# Patient Record
Sex: MALE | Race: WHITE | Employment: UNEMPLOYED | URBAN - NONMETROPOLITAN AREA
[De-identification: names, ages, dates, MRNs, and addresses within clinical notes are randomized per-mention and may not be internally consistent; named-entity substitution may affect disease eponyms.]

---

## 2022-08-16 ENCOUNTER — OFFICE VISIT (OUTPATIENT)
Dept: FAMILY MEDICINE CLINIC | Age: 62
End: 2022-08-16
Payer: MEDICARE

## 2022-08-16 VITALS
SYSTOLIC BLOOD PRESSURE: 108 MMHG | TEMPERATURE: 98.9 F | OXYGEN SATURATION: 96 % | WEIGHT: 183.2 LBS | HEIGHT: 67 IN | HEART RATE: 98 BPM | BODY MASS INDEX: 28.75 KG/M2 | DIASTOLIC BLOOD PRESSURE: 70 MMHG

## 2022-08-16 DIAGNOSIS — N13.8 BPH WITH OBSTRUCTION/LOWER URINARY TRACT SYMPTOMS: ICD-10-CM

## 2022-08-16 DIAGNOSIS — N40.1 BPH WITH OBSTRUCTION/LOWER URINARY TRACT SYMPTOMS: ICD-10-CM

## 2022-08-16 DIAGNOSIS — E55.9 VITAMIN D DEFICIENCY: ICD-10-CM

## 2022-08-16 DIAGNOSIS — Z12.5 PROSTATE CANCER SCREENING: ICD-10-CM

## 2022-08-16 DIAGNOSIS — Z13.220 SCREENING, LIPID: ICD-10-CM

## 2022-08-16 DIAGNOSIS — Z13.29 SCREENING FOR THYROID DISORDER: ICD-10-CM

## 2022-08-16 DIAGNOSIS — D18.01 CHERRY ANGIOMA: ICD-10-CM

## 2022-08-16 DIAGNOSIS — Z13.1 SCREENING FOR DIABETES MELLITUS: ICD-10-CM

## 2022-08-16 DIAGNOSIS — Z13.0 SCREENING FOR DEFICIENCY ANEMIA: ICD-10-CM

## 2022-08-16 DIAGNOSIS — I10 BENIGN ESSENTIAL HTN: Primary | ICD-10-CM

## 2022-08-16 PROBLEM — N43.3 HYDROCELE IN ADULT: Status: ACTIVE | Noted: 2018-07-13

## 2022-08-16 PROBLEM — M54.17 LUMBOSACRAL RADICULOPATHY: Status: ACTIVE | Noted: 2017-12-01

## 2022-08-16 PROCEDURE — 99203 OFFICE O/P NEW LOW 30 MIN: CPT | Performed by: FAMILY MEDICINE

## 2022-08-16 RX ORDER — MELOXICAM 15 MG/1
15 TABLET ORAL DAILY
COMMUNITY
Start: 2022-06-16

## 2022-08-16 RX ORDER — TIZANIDINE 2 MG/1
2 TABLET ORAL NIGHTLY PRN
COMMUNITY
Start: 2022-06-15

## 2022-08-16 RX ORDER — TERAZOSIN 5 MG/1
5 CAPSULE ORAL NIGHTLY
COMMUNITY

## 2022-08-16 SDOH — ECONOMIC STABILITY: FOOD INSECURITY: WITHIN THE PAST 12 MONTHS, THE FOOD YOU BOUGHT JUST DIDN'T LAST AND YOU DIDN'T HAVE MONEY TO GET MORE.: NEVER TRUE

## 2022-08-16 SDOH — ECONOMIC STABILITY: FOOD INSECURITY: WITHIN THE PAST 12 MONTHS, YOU WORRIED THAT YOUR FOOD WOULD RUN OUT BEFORE YOU GOT MONEY TO BUY MORE.: NEVER TRUE

## 2022-08-16 ASSESSMENT — ENCOUNTER SYMPTOMS
CHEST TIGHTNESS: 0
ABDOMINAL DISTENTION: 0
PHOTOPHOBIA: 0
ABDOMINAL PAIN: 0
SHORTNESS OF BREATH: 0

## 2022-08-16 ASSESSMENT — PATIENT HEALTH QUESTIONNAIRE - PHQ9
SUM OF ALL RESPONSES TO PHQ QUESTIONS 1-9: 0
1. LITTLE INTEREST OR PLEASURE IN DOING THINGS: 0
2. FEELING DOWN, DEPRESSED OR HOPELESS: 0
SUM OF ALL RESPONSES TO PHQ QUESTIONS 1-9: 0
SUM OF ALL RESPONSES TO PHQ9 QUESTIONS 1 & 2: 0

## 2022-08-16 ASSESSMENT — SOCIAL DETERMINANTS OF HEALTH (SDOH): HOW HARD IS IT FOR YOU TO PAY FOR THE VERY BASICS LIKE FOOD, HOUSING, MEDICAL CARE, AND HEATING?: NOT HARD AT ALL

## 2022-08-16 NOTE — PATIENT INSTRUCTIONS
Patient will continue all current medications. So far medication condition sounds stable. I did notice a history of elevated cholesterol in the past so we are rechecking to see the stability of that. Patient states he has not seen a urologist in many years we will check his PSA today. Follow-up in 3 months to confirm blood pressures under good control.

## 2022-08-16 NOTE — PROGRESS NOTES
----- Message from Giorgio Caballero MD sent at 2/7/2020  4:44 PM CST -----  Patient requesting out of network ORTHO followup as discussed;  Recall initial care with Dr Donato, now with Dr Lacey, anticipating office visit with xray and probably final visit. Scheduled on 2/11/2020   due etc.  Reviewed this with the patient      Current Outpatient Medications on File Prior to Visit   Medication Sig Dispense Refill    METOPROLOL SUCCINATE PO Take 50 mg by mouth daily      meloxicam (MOBIC) 15 MG tablet Take 15 mg by mouth in the morning. terazosin (HYTRIN) 5 MG capsule Take 5 mg by mouth nightly      tiZANidine (ZANAFLEX) 2 MG tablet Take 2 mg by mouth nightly as needed      ASPIRIN 81 PO Take by mouth       No current facility-administered medications on file prior to visit. Past Medical History:   Diagnosis Date    Hypertension      Past Surgical History:   Procedure Laterality Date    APPENDECTOMY       Social History     Socioeconomic History    Marital status: Single     Spouse name: Not on file    Number of children: Not on file    Years of education: Not on file    Highest education level: Not on file   Occupational History    Not on file   Tobacco Use    Smoking status: Never    Smokeless tobacco: Never   Substance and Sexual Activity    Alcohol use: Never    Drug use: Never    Sexual activity: Not on file   Other Topics Concern    Not on file   Social History Narrative    Not on file     Social Determinants of Health     Financial Resource Strain: Low Risk     Difficulty of Paying Living Expenses: Not hard at all   Food Insecurity: No Food Insecurity    Worried About Running Out of Food in the Last Year: Never true    Ran Out of Food in the Last Year: Never true   Transportation Needs: Not on file   Physical Activity: Not on file   Stress: Not on file   Social Connections: Not on file   Intimate Partner Violence: Not on file   Housing Stability: Not on file     Family History   Problem Relation Age of Onset    Diabetes Mother     Asthma Mother     Cancer Neg Hx     Heart Attack Neg Hx     Stroke Neg Hx      Allergies:  Patient has no known allergies. Review of Systems   Constitutional:  Negative for activity change, appetite change, diaphoresis and unexpected weight change. Eyes:  Negative for photophobia and visual disturbance. Respiratory:  Negative for chest tightness and shortness of breath. No orthopnea   Cardiovascular:  Negative for chest pain, palpitations and leg swelling. Gastrointestinal:  Negative for abdominal distention and abdominal pain. Genitourinary:  Negative for flank pain and frequency. Musculoskeletal:  Positive for arthralgias. Negative for gait problem and joint swelling. Neurological:  Negative for dizziness, weakness, light-headedness and headaches. Psychiatric/Behavioral:  Negative for confusion. Objective:   /70   Pulse 98   Temp 98.9 °F (37.2 °C)   Ht 5' 7\" (1.702 m)   Wt 183 lb 3.2 oz (83.1 kg)   SpO2 96%   BMI 28.69 kg/m²     Physical Exam  Vitals reviewed. Constitutional:       General: He is not in acute distress. Appearance: He is well-developed. HENT:      Head: Normocephalic and atraumatic. Right Ear: External ear normal.      Left Ear: External ear normal.      Nose: Nose normal.   Eyes:      General:         Right eye: No discharge. Left eye: No discharge. Conjunctiva/sclera: Conjunctivae normal.      Pupils: Pupils are equal, round, and reactive to light. Neck:      Thyroid: No thyromegaly. Vascular: No carotid bruit. Cardiovascular:      Rate and Rhythm: Normal rate and regular rhythm. Heart sounds: Normal heart sounds. Pulmonary:      Effort: Pulmonary effort is normal. No respiratory distress. Breath sounds: Normal breath sounds. Abdominal:      General: There is no distension. Musculoskeletal:      Cervical back: Neck supple. Skin:     General: Skin is warm and dry. Neurological:      Mental Status: He is alert and oriented to person, place, and time. Coordination: Coordination normal.   Psychiatric:         Thought Content: Thought content normal.         Judgment: Judgment normal.       No results found for this visit on 08/16/22.     No results found for this or any previous visit (from the past 2016 hour(s)). [] Pt was seen by provider for      Minutes  Counseling and coordination of care was done for all assessment diagnosis listed for today with patient and any family/friend present. More than 50% of this visit was spent coordinating current care, obtaining information for prior records, and counseling for current plan of action. Assessment:       Diagnosis Orders   1. Benign essential HTN        2. BPH with obstruction/lower urinary tract symptoms        3. Screening, lipid  Lipid, Fasting      4. Prostate cancer screening  PSA Screening      5. Screening for deficiency anemia  CBC with Auto Differential      6. Screening for diabetes mellitus  Basic Metabolic Panel      7. Screening for thyroid disorder  TSH with Reflex      8. Vitamin D deficiency  Vitamin D 25 Hydroxy      9. Cherry angioma              Orders Placed This Encounter   Procedures    CBC with Auto Differential     Standing Status:   Future     Standing Expiration Date:   3/76/7815    Basic Metabolic Panel     Standing Status:   Future     Standing Expiration Date:   8/16/2023    Vitamin D 25 Hydroxy     Standing Status:   Future     Standing Expiration Date:   8/16/2023    TSH with Reflex     Standing Status:   Future     Standing Expiration Date:   8/16/2023    Lipid, Fasting     Standing Status:   Future     Standing Expiration Date:   8/16/2023    PSA Screening     Standing Status:   Future     Standing Expiration Date:   8/16/2023       No orders of the defined types were placed in this encounter. Medication List            Accurate as of August 16, 2022  1:06 PM. If you have any questions, ask your nurse or doctor.                 CONTINUE taking these medications      ASPIRIN 81 PO     meloxicam 15 MG tablet  Commonly known as: MOBIC     METOPROLOL SUCCINATE PO     terazosin 5 MG capsule  Commonly known as: HYTRIN     tiZANidine 2 MG tablet  Commonly known

## 2022-08-22 DIAGNOSIS — Z13.220 SCREENING, LIPID: ICD-10-CM

## 2022-08-22 DIAGNOSIS — Z13.29 SCREENING FOR THYROID DISORDER: ICD-10-CM

## 2022-08-22 DIAGNOSIS — E55.9 VITAMIN D DEFICIENCY: ICD-10-CM

## 2022-08-22 DIAGNOSIS — Z13.1 SCREENING FOR DIABETES MELLITUS: ICD-10-CM

## 2022-08-22 DIAGNOSIS — Z13.0 SCREENING FOR DEFICIENCY ANEMIA: ICD-10-CM

## 2022-08-22 DIAGNOSIS — Z12.5 PROSTATE CANCER SCREENING: ICD-10-CM

## 2022-08-22 LAB
ANION GAP SERPL CALCULATED.3IONS-SCNC: 11 MEQ/L (ref 9–15)
BASOPHILS ABSOLUTE: 0 K/UL (ref 0–0.2)
BASOPHILS RELATIVE PERCENT: 0.7 %
BUN BLDV-MCNC: 18 MG/DL (ref 8–23)
CALCIUM SERPL-MCNC: 9.3 MG/DL (ref 8.5–9.9)
CHLORIDE BLD-SCNC: 109 MEQ/L (ref 95–107)
CHOLESTEROL, FASTING: 166 MG/DL (ref 0–199)
CO2: 21 MEQ/L (ref 20–31)
CREAT SERPL-MCNC: 1.27 MG/DL (ref 0.7–1.2)
EOSINOPHILS ABSOLUTE: 0.2 K/UL (ref 0–0.7)
EOSINOPHILS RELATIVE PERCENT: 3.1 %
GFR AFRICAN AMERICAN: >60
GFR NON-AFRICAN AMERICAN: 57.3
GLUCOSE BLD-MCNC: 88 MG/DL (ref 70–99)
HCT VFR BLD CALC: 45 % (ref 42–52)
HDLC SERPL-MCNC: 37 MG/DL (ref 40–59)
HEMOGLOBIN: 15.4 G/DL (ref 14–18)
LDL CHOLESTEROL CALCULATED: 119 MG/DL (ref 0–129)
LYMPHOCYTES ABSOLUTE: 1.3 K/UL (ref 1–4.8)
LYMPHOCYTES RELATIVE PERCENT: 21.8 %
MCH RBC QN AUTO: 31.1 PG (ref 27–31.3)
MCHC RBC AUTO-ENTMCNC: 34.2 % (ref 33–37)
MCV RBC AUTO: 91.2 FL (ref 80–100)
MONOCYTES ABSOLUTE: 0.6 K/UL (ref 0.2–0.8)
MONOCYTES RELATIVE PERCENT: 10.2 %
NEUTROPHILS ABSOLUTE: 3.7 K/UL (ref 1.4–6.5)
NEUTROPHILS RELATIVE PERCENT: 64.2 %
PDW BLD-RTO: 14 % (ref 11.5–14.5)
PLATELET # BLD: 248 K/UL (ref 130–400)
POTASSIUM SERPL-SCNC: 4.8 MEQ/L (ref 3.4–4.9)
PROSTATE SPECIFIC ANTIGEN: 1.48 NG/ML (ref 0–4)
RBC # BLD: 4.94 M/UL (ref 4.7–6.1)
SODIUM BLD-SCNC: 141 MEQ/L (ref 135–144)
TRIGLYCERIDE, FASTING: 50 MG/DL (ref 0–150)
TSH REFLEX: 2.52 UIU/ML (ref 0.44–3.86)
WBC # BLD: 5.8 K/UL (ref 4.8–10.8)

## 2022-08-23 DIAGNOSIS — R94.4 DECREASED GFR: ICD-10-CM

## 2022-08-23 DIAGNOSIS — E55.9 VITAMIN D DEFICIENCY: Primary | ICD-10-CM

## 2022-08-23 LAB — VITAMIN D 25-HYDROXY: 26.6 NG/ML

## 2022-08-23 RX ORDER — ERGOCALCIFEROL 1.25 MG/1
50000 CAPSULE ORAL WEEKLY
Qty: 4 CAPSULE | Refills: 1 | Status: SHIPPED | OUTPATIENT
Start: 2022-08-23 | End: 2022-10-12

## 2022-09-15 ENCOUNTER — TELEPHONE (OUTPATIENT)
Dept: FAMILY MEDICINE CLINIC | Age: 62
End: 2022-09-15

## 2022-09-15 ENCOUNTER — OFFICE VISIT (OUTPATIENT)
Dept: FAMILY MEDICINE CLINIC | Age: 62
End: 2022-09-15
Payer: MEDICARE

## 2022-09-15 VITALS
SYSTOLIC BLOOD PRESSURE: 122 MMHG | HEIGHT: 67 IN | DIASTOLIC BLOOD PRESSURE: 80 MMHG | BODY MASS INDEX: 29.26 KG/M2 | WEIGHT: 186.4 LBS | HEART RATE: 68 BPM | TEMPERATURE: 98.6 F | OXYGEN SATURATION: 98 %

## 2022-09-15 DIAGNOSIS — R41.89 IMPAIRMENT OF COGNITIVE FUNCTION: Primary | ICD-10-CM

## 2022-09-15 PROCEDURE — 99213 OFFICE O/P EST LOW 20 MIN: CPT | Performed by: FAMILY MEDICINE

## 2022-09-15 NOTE — PROGRESS NOTES
History     Socioeconomic History    Marital status: Single     Spouse name: Not on file    Number of children: Not on file    Years of education: Not on file    Highest education level: Not on file   Occupational History    Not on file   Tobacco Use    Smoking status: Never    Smokeless tobacco: Never   Substance and Sexual Activity    Alcohol use: Never    Drug use: Never    Sexual activity: Not on file   Other Topics Concern    Not on file   Social History Narrative    Not on file     Social Determinants of Health     Financial Resource Strain: Low Risk     Difficulty of Paying Living Expenses: Not hard at all   Food Insecurity: No Food Insecurity    Worried About Running Out of Food in the Last Year: Never true    Ran Out of Food in the Last Year: Never true   Transportation Needs: Not on file   Physical Activity: Not on file   Stress: Not on file   Social Connections: Not on file   Intimate Partner Violence: Not on file   Housing Stability: Not on file     Family History   Problem Relation Age of Onset    Diabetes Mother     Asthma Mother     Cancer Neg Hx     Heart Attack Neg Hx     Stroke Neg Hx      Allergies:  Patient has no known allergies. Objective:   /80   Pulse 68   Temp 98.6 °F (37 °C)   Ht 5' 7\" (1.702 m)   Wt 186 lb 6.4 oz (84.6 kg)   SpO2 98%   BMI 29.19 kg/m²         No results found for this visit on 09/15/22.     Recent Results (from the past 2016 hour(s))   PSA Screening    Collection Time: 08/22/22  9:03 AM   Result Value Ref Range    PSA 1.48 0.00 - 4.00 ng/mL   Lipid, Fasting    Collection Time: 08/22/22  9:03 AM   Result Value Ref Range    Cholesterol, Fasting 166 0 - 199 mg/dL    Triglyceride, Fasting 50 0 - 150 mg/dL    HDL 37 (L) 40 - 59 mg/dL    LDL Calculated 119 0 - 129 mg/dL   TSH with Reflex    Collection Time: 08/22/22  9:03 AM   Result Value Ref Range    TSH 2.520 0.440 - 3.860 uIU/mL   Vitamin D 25 Hydroxy    Collection Time: 08/22/22  9:03 AM   Result Value Ref Range    Vit D, 25-Hydroxy 26.6 (L) >29.9 ng/mL   Basic Metabolic Panel    Collection Time: 08/22/22  9:03 AM   Result Value Ref Range    Sodium 141 135 - 144 mEq/L    Potassium 4.8 3.4 - 4.9 mEq/L    Chloride 109 (H) 95 - 107 mEq/L    CO2 21 20 - 31 mEq/L    Anion Gap 11 9 - 15 mEq/L    Glucose 88 70 - 99 mg/dL    BUN 18 8 - 23 mg/dL    Creatinine 1.27 (H) 0.70 - 1.20 mg/dL    GFR Non-African American 57.3 (L) >60    GFR  >60.0 >60    Calcium 9.3 8.5 - 9.9 mg/dL   CBC with Auto Differential    Collection Time: 08/22/22  9:03 AM   Result Value Ref Range    WBC 5.8 4.8 - 10.8 K/uL    RBC 4.94 4.70 - 6.10 M/uL    Hemoglobin 15.4 14.0 - 18.0 g/dL    Hematocrit 45.0 42.0 - 52.0 %    MCV 91.2 80.0 - 100.0 fL    MCH 31.1 27.0 - 31.3 pg    MCHC 34.2 33.0 - 37.0 %    RDW 14.0 11.5 - 14.5 %    Platelets 521 422 - 413 K/uL    Neutrophils % 64.2 %    Lymphocytes % 21.8 %    Monocytes % 10.2 %    Eosinophils % 3.1 %    Basophils % 0.7 %    Neutrophils Absolute 3.7 1.4 - 6.5 K/uL    Lymphocytes Absolute 1.3 1.0 - 4.8 K/uL    Monocytes Absolute 0.6 0.2 - 0.8 K/uL    Eosinophils Absolute 0.2 0.0 - 0.7 K/uL    Basophils Absolute 0.0 0.0 - 0.2 K/uL       [x] Pt was seen by provider for   25   Minutes  Counseling and coordination of care was done for all assessment diagnosis listed for today with patient and any family/friend present. More than 50% of this visit was spent coordinating current care, obtaining information for prior records, and counseling for current plan of action. Assessment:       Diagnosis Orders   1. Impairment of cognitive function              No orders of the defined types were placed in this encounter. No orders of the defined types were placed in this encounter. Medication List            Accurate as of September 15, 2022  3:29 PM. If you have any questions, ask your nurse or doctor.                 CONTINUE taking these medications      ASPIRIN 81 PO     meloxicam 15 MG tablet  Commonly known as: MOBIC     METOPROLOL SUCCINATE PO     terazosin 5 MG capsule  Commonly known as: HYTRIN     tiZANidine 2 MG tablet  Commonly known as: ZANAFLEX     vitamin D 1.25 MG (68895 UT) Caps capsule  Commonly known as: ERGOCALCIFEROL  Take 1 capsule by mouth once a week for 8 doses                Plan:   Return for Based on test results. There are no Patient Instructions on file for this visit. This note was partially created with the assistance of dictation. This may lead to grammatical or spelling errors. Shubham Quintero M.D.

## 2022-09-15 NOTE — TELEPHONE ENCOUNTER
Debbie Suazo [facility] [address]   [address],  [address] [place] [address]

## 2022-11-17 ENCOUNTER — OFFICE VISIT (OUTPATIENT)
Dept: FAMILY MEDICINE CLINIC | Age: 62
End: 2022-11-17
Payer: MEDICARE

## 2022-11-17 VITALS
BODY MASS INDEX: 30.61 KG/M2 | SYSTOLIC BLOOD PRESSURE: 128 MMHG | DIASTOLIC BLOOD PRESSURE: 76 MMHG | OXYGEN SATURATION: 98 % | HEIGHT: 67 IN | HEART RATE: 75 BPM | WEIGHT: 195 LBS | TEMPERATURE: 97.4 F

## 2022-11-17 DIAGNOSIS — N28.9 RENAL INSUFFICIENCY: Primary | ICD-10-CM

## 2022-11-17 DIAGNOSIS — E55.9 VITAMIN D DEFICIENCY: ICD-10-CM

## 2022-11-17 DIAGNOSIS — M54.16 LUMBAR RADICULOPATHY: ICD-10-CM

## 2022-11-17 DIAGNOSIS — N28.9 RENAL INSUFFICIENCY: ICD-10-CM

## 2022-11-17 LAB
ANION GAP SERPL CALCULATED.3IONS-SCNC: 15 MEQ/L (ref 9–15)
BUN BLDV-MCNC: 16 MG/DL (ref 8–23)
CALCIUM SERPL-MCNC: 9.3 MG/DL (ref 8.5–9.9)
CHLORIDE BLD-SCNC: 101 MEQ/L (ref 95–107)
CO2: 21 MEQ/L (ref 20–31)
CREAT SERPL-MCNC: 1.24 MG/DL (ref 0.7–1.2)
GFR SERPL CREATININE-BSD FRML MDRD: >60 ML/MIN/{1.73_M2}
GLUCOSE BLD-MCNC: 84 MG/DL (ref 70–99)
POTASSIUM SERPL-SCNC: 4.4 MEQ/L (ref 3.4–4.9)
SODIUM BLD-SCNC: 137 MEQ/L (ref 135–144)

## 2022-11-17 PROCEDURE — 99214 OFFICE O/P EST MOD 30 MIN: CPT | Performed by: FAMILY MEDICINE

## 2022-11-17 PROCEDURE — 3078F DIAST BP <80 MM HG: CPT | Performed by: FAMILY MEDICINE

## 2022-11-17 PROCEDURE — 3074F SYST BP LT 130 MM HG: CPT | Performed by: FAMILY MEDICINE

## 2022-11-17 RX ORDER — ERGOCALCIFEROL 1.25 MG/1
50000 CAPSULE ORAL WEEKLY
Qty: 13 CAPSULE | Refills: 3 | Status: SHIPPED | OUTPATIENT
Start: 2022-11-17 | End: 2029-11-09

## 2022-11-17 ASSESSMENT — ENCOUNTER SYMPTOMS
SHORTNESS OF BREATH: 0
CHEST TIGHTNESS: 0
PHOTOPHOBIA: 0
BACK PAIN: 1
ABDOMINAL PAIN: 0
ABDOMINAL DISTENTION: 0

## 2022-11-17 ASSESSMENT — PATIENT HEALTH QUESTIONNAIRE - PHQ9
SUM OF ALL RESPONSES TO PHQ QUESTIONS 1-9: 0
1. LITTLE INTEREST OR PLEASURE IN DOING THINGS: 0
SUM OF ALL RESPONSES TO PHQ9 QUESTIONS 1 & 2: 0
2. FEELING DOWN, DEPRESSED OR HOPELESS: 0
SUM OF ALL RESPONSES TO PHQ QUESTIONS 1-9: 0

## 2022-11-17 NOTE — PATIENT INSTRUCTIONS
Patient is aware of weight gain. We will be working on diet control and activity. He prefers walking for his activity. Blood pressure remains controlled no changes. Renal insufficiency and vitamin D deficiency will be checked on today by lab work.   Follow-up appointment 6 months    Pt encouraged to be doing his lumbar exercises

## 2022-11-17 NOTE — PROGRESS NOTES
Diagnosis Orders   1. Renal insufficiency  Basic Metabolic Panel      2. Vitamin D deficiency  vitamin D (ERGOCALCIFEROL) 1.25 MG (90360 UT) CAPS capsule    Vitamin D 25 Hydroxy      3. Lumbar radiculopathy          Return in about 6 months (around 5/17/2023) for for review of outcome of today's recommendation. Patient Instructions   Patient is aware of weight gain. We will be working on diet control and activity. He prefers walking for his activity. Blood pressure remains controlled no changes. Renal insufficiency and vitamin D deficiency will be checked on today by lab work. Follow-up appointment 6 months    Pt encouraged to be doing his lumbar exercises    Subjective:      Patient ID: Rossy Crow is a 60 y.o. male who presents for:  Chief Complaint   Patient presents with    Hypertension     X 3 month follow up     Discuss Medications    Leg Problem     Right leg jumps when sitting        Here for follow-up of blood work and blood pressure. Feeling well overall. Doing some walking but because he is not working does not have a lot of activity and does realize he is gaining some weight and snacking too much. Patient does note pain in his low back that goes down his leg at times. It can occur when he sitting. It also can occur when he walks for a while. He is not doing any exercise. They have been in touch with resources and they are waiting for him to get assigned to a job. Current Outpatient Medications on File Prior to Visit   Medication Sig Dispense Refill    METOPROLOL SUCCINATE PO Take 50 mg by mouth daily      meloxicam (MOBIC) 15 MG tablet Take 15 mg by mouth in the morning. terazosin (HYTRIN) 5 MG capsule Take 5 mg by mouth nightly      tiZANidine (ZANAFLEX) 2 MG tablet Take 2 mg by mouth nightly as needed      ASPIRIN 81 PO Take by mouth       No current facility-administered medications on file prior to visit.      Past Medical History:   Diagnosis Date    Hypertension Past Surgical History:   Procedure Laterality Date    APPENDECTOMY       Social History     Socioeconomic History    Marital status: Single     Spouse name: Not on file    Number of children: Not on file    Years of education: Not on file    Highest education level: Not on file   Occupational History    Not on file   Tobacco Use    Smoking status: Never    Smokeless tobacco: Never   Substance and Sexual Activity    Alcohol use: Never    Drug use: Never    Sexual activity: Not on file   Other Topics Concern    Not on file   Social History Narrative    Not on file     Social Determinants of Health     Financial Resource Strain: Low Risk     Difficulty of Paying Living Expenses: Not hard at all   Food Insecurity: No Food Insecurity    Worried About Running Out of Food in the Last Year: Never true    Ran Out of Food in the Last Year: Never true   Transportation Needs: Not on file   Physical Activity: Not on file   Stress: Not on file   Social Connections: Not on file   Intimate Partner Violence: Not on file   Housing Stability: Not on file     Family History   Problem Relation Age of Onset    Diabetes Mother     Asthma Mother     Cancer Neg Hx     Heart Attack Neg Hx     Stroke Neg Hx      Allergies:  Patient has no known allergies. Review of Systems   Constitutional:  Negative for activity change, appetite change, diaphoresis and unexpected weight change. Eyes:  Negative for photophobia and visual disturbance. Respiratory:  Negative for chest tightness and shortness of breath. No orthopnea   Cardiovascular:  Negative for chest pain, palpitations and leg swelling. Gastrointestinal:  Negative for abdominal distention and abdominal pain. Genitourinary:  Negative for flank pain and frequency. Musculoskeletal:  Positive for back pain. Negative for gait problem and joint swelling. Neurological:  Negative for dizziness, weakness, light-headedness and headaches.    Psychiatric/Behavioral:  Negative for confusion. Objective:   /76   Pulse 75   Temp 97.4 °F (36.3 °C)   Ht 5' 7\" (1.702 m)   Wt 195 lb (88.5 kg)   SpO2 98%   BMI 30.54 kg/m²     Physical Exam  Vitals reviewed. Constitutional:       General: He is not in acute distress. Appearance: He is well-developed. HENT:      Head: Normocephalic and atraumatic. Right Ear: External ear normal.      Left Ear: External ear normal.      Nose: Nose normal.   Eyes:      General:         Right eye: No discharge. Left eye: No discharge. Conjunctiva/sclera: Conjunctivae normal.      Pupils: Pupils are equal, round, and reactive to light. Neck:      Thyroid: No thyromegaly. Cardiovascular:      Rate and Rhythm: Normal rate and regular rhythm. Pulmonary:      Effort: Pulmonary effort is normal. No respiratory distress. Abdominal:      General: There is no distension. Musculoskeletal:      Cervical back: Neck supple. Skin:     General: Skin is warm and dry. Neurological:      Mental Status: He is alert and oriented to person, place, and time. Motor: No weakness. Coordination: Coordination normal.      Gait: Gait normal.   Psychiatric:         Thought Content: Thought content normal.         Judgment: Judgment normal.       No results found for this visit on 11/17/22. No results found for this or any previous visit (from the past 2016 hour(s)). [] Pt was seen by provider for      Minutes  Counseling and coordination of care was done for all assessment diagnosis listed for today with patient and any family/friend present. More than 50% of this visit was spent coordinating current care, obtaining information for prior records, and counseling for current plan of action. Assessment:       Diagnosis Orders   1. Renal insufficiency  Basic Metabolic Panel      2. Vitamin D deficiency  vitamin D (ERGOCALCIFEROL) 1.25 MG (30653 UT) CAPS capsule    Vitamin D 25 Hydroxy      3.  Lumbar radiculopathy Orders Placed This Encounter   Procedures    Vitamin D 25 Hydroxy     Standing Status:   Future     Number of Occurrences:   1     Standing Expiration Date:   01/28/7227    Basic Metabolic Panel     Standing Status:   Future     Number of Occurrences:   1     Standing Expiration Date:   11/17/2023         Orders Placed This Encounter   Medications    vitamin D (ERGOCALCIFEROL) 1.25 MG (52696 UT) CAPS capsule     Sig: Take 1 capsule by mouth once a week for 365 doses     Dispense:  13 capsule     Refill:  3            Medication List            Accurate as of November 17, 2022 11:00 AM. If you have any questions, ask your nurse or doctor. CONTINUE taking these medications      ASPIRIN 81 PO     meloxicam 15 MG tablet  Commonly known as: MOBIC     METOPROLOL SUCCINATE PO     terazosin 5 MG capsule  Commonly known as: HYTRIN     tiZANidine 2 MG tablet  Commonly known as: ZANAFLEX     vitamin D 1.25 MG (57697 UT) Caps capsule  Commonly known as: ERGOCALCIFEROL  Take 1 capsule by mouth once a week for 365 doses               Where to Get Your Medications        These medications were sent to [facility], [address] [place], [place] [address]dwayne [place]ton  [address] [address]      Phone: [phone]   vitamin D 1.25 MG (01220 UT) Caps capsule           Plan:   Return in about 6 months (around 5/17/2023) for for review of outcome of today's recommendation. Patient Instructions   Patient is aware of weight gain. We will be working on diet control and activity. He prefers walking for his activity. Blood pressure remains controlled no changes. Renal insufficiency and vitamin D deficiency will be checked on today by lab work. Follow-up appointment 6 months    Pt encouraged to be doing his lumbar exercises    This note was partially created with the assistance of dictation. This may lead to grammatical or spelling errors.       Shubham LEWIS Precious Angel M.D.

## 2022-11-17 NOTE — PROGRESS NOTES
Diagnosis Orders   1. Benign essential HTN          2. BPH with obstruction/lower urinary tract symptoms          3. Screening, lipid  Lipid, Fasting       4. Prostate cancer screening  PSA Screening       5. Screening for deficiency anemia  CBC with Auto Differential       6. Screening for diabetes mellitus  Basic Metabolic Panel       7. Screening for thyroid disorder  TSH with Reflex       8. Vitamin D deficiency  Vitamin D 25 Hydroxy       9. Cherry angioma             Return in about 3 months (around 11/16/2022) for for routine major medical condition management. Patient Instructions   Patient will continue all current medications. So far medication condition sounds stable. I did notice a history of elevated cholesterol in the past so we are rechecking to see the stability of that. Patient states he has not seen a urologist in many years we will check his PSA today. Follow-up in 3 months to confirm blood pressures under good control.

## 2022-11-18 LAB — VITAMIN D 25-HYDROXY: 37.9 NG/ML

## 2023-02-28 ENCOUNTER — TELEPHONE (OUTPATIENT)
Dept: GASTROENTEROLOGY | Age: 63
End: 2023-02-28

## 2023-02-28 DIAGNOSIS — Z12.11 COLON CANCER SCREENING: Primary | ICD-10-CM

## 2023-02-28 NOTE — TELEPHONE ENCOUNTER
Enlargement in right lung mets noted on CT 11/28  His primary oncologist is aware  Would benefit from palliative care evaluation     Spoke to patient, agreeable to colonoscopy. Patient scheduled 7/11/2023 at 10:30 am. Miralax Prep mailed to patient. Referral pended to PCP. Info faxed to [facility].

## 2023-05-17 ENCOUNTER — OFFICE VISIT (OUTPATIENT)
Dept: FAMILY MEDICINE CLINIC | Age: 63
End: 2023-05-17
Payer: MEDICARE

## 2023-05-17 VITALS
DIASTOLIC BLOOD PRESSURE: 78 MMHG | BODY MASS INDEX: 30.29 KG/M2 | HEART RATE: 89 BPM | OXYGEN SATURATION: 97 % | HEIGHT: 67 IN | SYSTOLIC BLOOD PRESSURE: 136 MMHG | WEIGHT: 193 LBS | TEMPERATURE: 97.6 F

## 2023-05-17 DIAGNOSIS — Z00.00 WELL ADULT EXAM: Primary | ICD-10-CM

## 2023-05-17 DIAGNOSIS — N28.9 RENAL INSUFFICIENCY: ICD-10-CM

## 2023-05-17 DIAGNOSIS — F41.0 ANXIETY ATTACK: ICD-10-CM

## 2023-05-17 DIAGNOSIS — E55.9 VITAMIN D DEFICIENCY: ICD-10-CM

## 2023-05-17 DIAGNOSIS — Z12.5 PROSTATE CANCER SCREENING: ICD-10-CM

## 2023-05-17 DIAGNOSIS — M54.16 LUMBAR RADICULOPATHY: ICD-10-CM

## 2023-05-17 DIAGNOSIS — R60.0 BILATERAL LEG EDEMA: ICD-10-CM

## 2023-05-17 DIAGNOSIS — L29.9 ITCHING: ICD-10-CM

## 2023-05-17 DIAGNOSIS — M25.561 PAIN AND SWELLING OF RIGHT KNEE: ICD-10-CM

## 2023-05-17 DIAGNOSIS — M25.461 PAIN AND SWELLING OF RIGHT KNEE: ICD-10-CM

## 2023-05-17 LAB
ANION GAP SERPL CALCULATED.3IONS-SCNC: 12 MEQ/L (ref 9–15)
BUN SERPL-MCNC: 13 MG/DL (ref 8–23)
CALCIUM SERPL-MCNC: 9.3 MG/DL (ref 8.5–9.9)
CHLORIDE SERPL-SCNC: 109 MEQ/L (ref 95–107)
CO2 SERPL-SCNC: 20 MEQ/L (ref 20–31)
CREAT SERPL-MCNC: 1.07 MG/DL (ref 0.7–1.2)
GLUCOSE SERPL-MCNC: 88 MG/DL (ref 70–99)
POTASSIUM SERPL-SCNC: 4.4 MEQ/L (ref 3.4–4.9)
PSA SERPL-MCNC: 1.72 NG/ML (ref 0–4)
SODIUM SERPL-SCNC: 141 MEQ/L (ref 135–144)

## 2023-05-17 PROCEDURE — 99396 PREV VISIT EST AGE 40-64: CPT | Performed by: FAMILY MEDICINE

## 2023-05-17 PROCEDURE — 3078F DIAST BP <80 MM HG: CPT | Performed by: FAMILY MEDICINE

## 2023-05-17 PROCEDURE — 3075F SYST BP GE 130 - 139MM HG: CPT | Performed by: FAMILY MEDICINE

## 2023-05-17 PROCEDURE — 99214 OFFICE O/P EST MOD 30 MIN: CPT | Performed by: FAMILY MEDICINE

## 2023-05-17 RX ORDER — ERGOCALCIFEROL 1.25 MG/1
50000 CAPSULE ORAL WEEKLY
Qty: 13 CAPSULE | Refills: 3 | Status: SHIPPED | OUTPATIENT
Start: 2023-05-17 | End: 2030-05-09

## 2023-05-17 SDOH — ECONOMIC STABILITY: INCOME INSECURITY: HOW HARD IS IT FOR YOU TO PAY FOR THE VERY BASICS LIKE FOOD, HOUSING, MEDICAL CARE, AND HEATING?: NOT HARD AT ALL

## 2023-05-17 SDOH — ECONOMIC STABILITY: HOUSING INSECURITY
IN THE LAST 12 MONTHS, WAS THERE A TIME WHEN YOU DID NOT HAVE A STEADY PLACE TO SLEEP OR SLEPT IN A SHELTER (INCLUDING NOW)?: NO

## 2023-05-17 SDOH — ECONOMIC STABILITY: FOOD INSECURITY: WITHIN THE PAST 12 MONTHS, YOU WORRIED THAT YOUR FOOD WOULD RUN OUT BEFORE YOU GOT MONEY TO BUY MORE.: NEVER TRUE

## 2023-05-17 SDOH — ECONOMIC STABILITY: FOOD INSECURITY: WITHIN THE PAST 12 MONTHS, THE FOOD YOU BOUGHT JUST DIDN'T LAST AND YOU DIDN'T HAVE MONEY TO GET MORE.: NEVER TRUE

## 2023-05-17 ASSESSMENT — PATIENT HEALTH QUESTIONNAIRE - PHQ9
SUM OF ALL RESPONSES TO PHQ QUESTIONS 1-9: 0
SUM OF ALL RESPONSES TO PHQ QUESTIONS 1-9: 0
2. FEELING DOWN, DEPRESSED OR HOPELESS: 0
SUM OF ALL RESPONSES TO PHQ QUESTIONS 1-9: 0
1. LITTLE INTEREST OR PLEASURE IN DOING THINGS: 0
SUM OF ALL RESPONSES TO PHQ9 QUESTIONS 1 & 2: 0
SUM OF ALL RESPONSES TO PHQ QUESTIONS 1-9: 0

## 2023-05-17 ASSESSMENT — ENCOUNTER SYMPTOMS
ABDOMINAL PAIN: 0
ABDOMINAL DISTENTION: 0
CHEST TIGHTNESS: 0
SHORTNESS OF BREATH: 0
PHOTOPHOBIA: 0

## 2023-05-17 NOTE — PROGRESS NOTES
Diagnosis Orders   1. Well adult exam        2. Renal insufficiency  Basic Metabolic Panel      3. Vitamin D deficiency  vitamin D (ERGOCALCIFEROL) 1.25 MG (32492 UT) CAPS capsule    Vitamin D 25 Hydroxy      4. Prostate cancer screening  PSA Screening      5. Bilateral leg edema        6. Itching        7. Pain and swelling of right knee        8. Lumbar radiculopathy        9. Anxiety attack          Return in about 6 months (around 11/17/2023) for for routine major medical condition management, lab/test review. Patient Instructions   Patient is aware knee swelling can be from repetitive trauma. Best result will come from icing for 5 to 10 minutes with protected ice pack to avoid frostbite to the skin. He can do this multiple times a day. Patient is aware lower extremity edema responds best to compression socks put on first thing in the morning. At least a pressure of 15 to 20 mmHg. They may be removed at bedtime for sleep. Patient is aware to do a trial of Benadryl 25 to 50 mg in the evening to see if this causes sedation. If this causes sedation he can utilize this for his travel anxiety if needed. Patient is aware of the radicular symptom of his back with the leg jumping is likely related to temporary nerve irritation and when this occurs he should change the position of his back to relieve the symptom. Patient is itching in the lower extremities as a result of the edema and irritation to the skin. He is aware of how to fix that with the compression socks. Patient will have his laboratory work done today to monitor health conditions and preventative health care. He is also planning on having his colonoscopy in July. Order has been report provided for repeat renal function check in 6 months.   Appointment that    HPI  Jairo Rivers is a 60 y.o. male and is here for a comprehensive physical exam.    The patient reportsproblems -patient notes swelling of his lower extremities

## 2023-05-17 NOTE — PROGRESS NOTES
Diagnosis Orders   1. Renal insufficiency  Basic Metabolic Panel       2. Vitamin D deficiency  vitamin D (ERGOCALCIFEROL) 1.25 MG (51689 UT) CAPS capsule     Vitamin D 25 Hydroxy       3. Lumbar radiculopathy             Return in about 6 months (around 5/17/2023) for for review of outcome of today's recommendation. Patient Instructions   Patient is aware of weight gain. We will be working on diet control and activity. He prefers walking for his activity. Blood pressure remains controlled no changes. Renal insufficiency and vitamin D deficiency will be checked on today by lab work.   Follow-up appointment 6 months     Pt encouraged to be doing his lumbar exercises

## 2023-05-17 NOTE — PATIENT INSTRUCTIONS
Patient is aware knee swelling can be from repetitive trauma. Best result will come from icing for 5 to 10 minutes with protected ice pack to avoid frostbite to the skin. He can do this multiple times a day. Patient is aware lower extremity edema responds best to compression socks put on first thing in the morning. At least a pressure of 15 to 20 mmHg. They may be removed at bedtime for sleep. Patient is aware to do a trial of Benadryl 25 to 50 mg in the evening to see if this causes sedation. If this causes sedation he can utilize this for his travel anxiety if needed. Patient is aware of the radicular symptom of his back with the leg jumping is likely related to temporary nerve irritation and when this occurs he should change the position of his back to relieve the symptom. Patient is itching in the lower extremities as a result of the edema and irritation to the skin. He is aware of how to fix that with the compression socks. Patient will have his laboratory work done today to monitor health conditions and preventative health care. He is also planning on having his colonoscopy in July. Order has been report provided for repeat renal function check in 6 months.   Appointment that

## 2023-05-18 LAB — VITAMIN D 25-HYDROXY: 50.8 NG/ML

## 2023-07-07 ENCOUNTER — PREP FOR PROCEDURE (OUTPATIENT)
Dept: GASTROENTEROLOGY | Age: 63
End: 2023-07-07

## 2023-07-08 RX ORDER — SODIUM CHLORIDE 9 MG/ML
INJECTION, SOLUTION INTRAVENOUS PRN
Status: CANCELLED | OUTPATIENT
Start: 2023-07-08

## 2023-07-08 RX ORDER — SODIUM CHLORIDE 9 MG/ML
INJECTION, SOLUTION INTRAVENOUS CONTINUOUS
Status: CANCELLED | OUTPATIENT
Start: 2023-07-08

## 2023-07-08 RX ORDER — SODIUM CHLORIDE 0.9 % (FLUSH) 0.9 %
5-40 SYRINGE (ML) INJECTION EVERY 12 HOURS SCHEDULED
Status: CANCELLED | OUTPATIENT
Start: 2023-07-08

## 2023-07-11 ENCOUNTER — ANESTHESIA (OUTPATIENT)
Dept: ENDOSCOPY | Age: 63
End: 2023-07-11
Payer: MEDICARE

## 2023-07-11 ENCOUNTER — HOSPITAL ENCOUNTER (OUTPATIENT)
Age: 63
Setting detail: OUTPATIENT SURGERY
Discharge: HOME | End: 2023-07-11
Attending: INTERNAL MEDICINE | Admitting: INTERNAL MEDICINE
Payer: MEDICARE

## 2023-07-11 ENCOUNTER — ANESTHESIA EVENT (OUTPATIENT)
Dept: ENDOSCOPY | Age: 63
End: 2023-07-11
Payer: MEDICARE

## 2023-07-11 VITALS
HEART RATE: 82 BPM | HEIGHT: 68 IN | TEMPERATURE: 97.6 F | DIASTOLIC BLOOD PRESSURE: 76 MMHG | WEIGHT: 188 LBS | RESPIRATION RATE: 16 BRPM | BODY MASS INDEX: 28.49 KG/M2 | SYSTOLIC BLOOD PRESSURE: 114 MMHG | OXYGEN SATURATION: 100 %

## 2023-07-11 DIAGNOSIS — Z12.11 COLON CANCER SCREENING: ICD-10-CM

## 2023-07-11 PROBLEM — K63.5 POLYP OF COLON: Status: ACTIVE | Noted: 2023-07-11

## 2023-07-11 PROCEDURE — 3700000000 HC ANESTHESIA ATTENDED CARE: Performed by: INTERNAL MEDICINE

## 2023-07-11 PROCEDURE — 2580000003 HC RX 258

## 2023-07-11 PROCEDURE — 7100000011 HC PHASE II RECOVERY - ADDTL 15 MIN: Performed by: INTERNAL MEDICINE

## 2023-07-11 PROCEDURE — 2580000003 HC RX 258: Performed by: INTERNAL MEDICINE

## 2023-07-11 PROCEDURE — 2709999900 HC NON-CHARGEABLE SUPPLY: Performed by: INTERNAL MEDICINE

## 2023-07-11 PROCEDURE — 6370000000 HC RX 637 (ALT 250 FOR IP): Performed by: INTERNAL MEDICINE

## 2023-07-11 PROCEDURE — 3609027000 HC COLONOSCOPY: Performed by: INTERNAL MEDICINE

## 2023-07-11 PROCEDURE — 88305 TISSUE EXAM BY PATHOLOGIST: CPT

## 2023-07-11 PROCEDURE — 7100000010 HC PHASE II RECOVERY - FIRST 15 MIN: Performed by: INTERNAL MEDICINE

## 2023-07-11 PROCEDURE — 3700000001 HC ADD 15 MINUTES (ANESTHESIA): Performed by: INTERNAL MEDICINE

## 2023-07-11 PROCEDURE — 6360000002 HC RX W HCPCS: Performed by: NURSE ANESTHETIST, CERTIFIED REGISTERED

## 2023-07-11 RX ORDER — SIMETHICONE 20 MG/.3ML
EMULSION ORAL PRN
Status: DISCONTINUED | OUTPATIENT
Start: 2023-07-11 | End: 2023-07-11 | Stop reason: ALTCHOICE

## 2023-07-11 RX ORDER — SODIUM CHLORIDE 9 MG/ML
INJECTION, SOLUTION INTRAVENOUS
Status: COMPLETED
Start: 2023-07-11 | End: 2023-07-11

## 2023-07-11 RX ORDER — PROPOFOL 10 MG/ML
INJECTION, EMULSION INTRAVENOUS PRN
Status: DISCONTINUED | OUTPATIENT
Start: 2023-07-11 | End: 2023-07-11 | Stop reason: SDUPTHER

## 2023-07-11 RX ORDER — SODIUM CHLORIDE 9 MG/ML
INJECTION, SOLUTION INTRAVENOUS CONTINUOUS
Status: DISCONTINUED | OUTPATIENT
Start: 2023-07-11 | End: 2023-07-11 | Stop reason: HOSPADM

## 2023-07-11 RX ORDER — SODIUM CHLORIDE 0.9 % (FLUSH) 0.9 %
5-40 SYRINGE (ML) INJECTION EVERY 12 HOURS SCHEDULED
Status: DISCONTINUED | OUTPATIENT
Start: 2023-07-11 | End: 2023-07-11 | Stop reason: HOSPADM

## 2023-07-11 RX ORDER — SODIUM CHLORIDE 9 MG/ML
INJECTION, SOLUTION INTRAVENOUS PRN
Status: DISCONTINUED | OUTPATIENT
Start: 2023-07-11 | End: 2023-07-11 | Stop reason: HOSPADM

## 2023-07-11 RX ORDER — MAGNESIUM HYDROXIDE 1200 MG/15ML
LIQUID ORAL PRN
Status: DISCONTINUED | OUTPATIENT
Start: 2023-07-11 | End: 2023-07-11 | Stop reason: ALTCHOICE

## 2023-07-11 RX ADMIN — SODIUM CHLORIDE: 9 INJECTION, SOLUTION INTRAVENOUS at 10:17

## 2023-07-11 RX ADMIN — PROPOFOL 50 MG: 10 INJECTION, EMULSION INTRAVENOUS at 10:53

## 2023-07-11 RX ADMIN — PROPOFOL 100 MG: 10 INJECTION, EMULSION INTRAVENOUS at 10:39

## 2023-07-11 RX ADMIN — PROPOFOL 50 MG: 10 INJECTION, EMULSION INTRAVENOUS at 10:59

## 2023-07-11 RX ADMIN — PROPOFOL 50 MG: 10 INJECTION, EMULSION INTRAVENOUS at 10:49

## 2023-07-11 RX ADMIN — PROPOFOL 50 MG: 10 INJECTION, EMULSION INTRAVENOUS at 10:44

## 2023-07-11 ASSESSMENT — PAIN - FUNCTIONAL ASSESSMENT
PAIN_FUNCTIONAL_ASSESSMENT: NONE - DENIES PAIN
PAIN_FUNCTIONAL_ASSESSMENT: NONE - DENIES PAIN

## 2023-07-11 NOTE — ANESTHESIA POSTPROCEDURE EVALUATION
Department of Anesthesiology  Postprocedure Note    Patient: Jenny Toure  MRN: [id number]  YOB: 1960  Date of evaluation: 7/11/2023      Procedure Summary     Date: 07/11/23 Room / Location: [address] [place] [address] / [facility]    Anesthesia Start: 1038 Anesthesia Stop: 1106    Procedure: COLORECTAL CANCER SCREENING, NOT HIGH RISK Diagnosis:       Colon cancer screening      (Colon cancer screening [Z12.11])    Surgeons: Elijah Favre, MD Responsible Provider: MARLENA Sanders CRNA    Anesthesia Type: MAC ASA Status: 2          Anesthesia Type: MAC    Bebo Phase I: Bebo Score: 10    Bebo Phase II:        Anesthesia Post Evaluation    Patient location during evaluation: bedside  Patient participation: complete - patient participated  Level of consciousness: awake and awake and alert  Airway patency: patent  Nausea & Vomiting: no nausea and no vomiting  Complications: no  Cardiovascular status: blood pressure returned to baseline and hemodynamically stable  Respiratory status: acceptable  Hydration status: euvolemic

## 2023-07-25 ENCOUNTER — OFFICE VISIT (OUTPATIENT)
Dept: GASTROENTEROLOGY | Age: 63
End: 2023-07-25
Payer: MEDICARE

## 2023-07-25 VITALS
OXYGEN SATURATION: 98 % | WEIGHT: 188 LBS | BODY MASS INDEX: 28.59 KG/M2 | SYSTOLIC BLOOD PRESSURE: 126 MMHG | HEART RATE: 67 BPM | DIASTOLIC BLOOD PRESSURE: 76 MMHG

## 2023-07-25 DIAGNOSIS — Z86.010 HX OF ADENOMATOUS COLONIC POLYPS: Primary | ICD-10-CM

## 2023-07-25 PROBLEM — Z86.0101 HX OF ADENOMATOUS COLONIC POLYPS: Status: ACTIVE | Noted: 2023-07-25

## 2023-07-25 PROCEDURE — 3074F SYST BP LT 130 MM HG: CPT | Performed by: NURSE PRACTITIONER

## 2023-07-25 PROCEDURE — 99214 OFFICE O/P EST MOD 30 MIN: CPT | Performed by: NURSE PRACTITIONER

## 2023-07-25 PROCEDURE — 3078F DIAST BP <80 MM HG: CPT | Performed by: NURSE PRACTITIONER

## 2023-07-25 RX ORDER — POLYETHYLENE GLYCOL 3350, SODIUM CHLORIDE, SODIUM BICARBONATE, POTASSIUM CHLORIDE 420; 11.2; 5.72; 1.48 G/4L; G/4L; G/4L; G/4L
4000 POWDER, FOR SOLUTION ORAL ONCE
Qty: 4000 ML | Refills: 0 | Status: SHIPPED | OUTPATIENT
Start: 2023-07-25 | End: 2023-07-25

## 2023-07-25 ASSESSMENT — ENCOUNTER SYMPTOMS
ABDOMINAL PAIN: 0
VOICE CHANGE: 0
RECTAL PAIN: 0
VOMITING: 0
DIARRHEA: 0
COLOR CHANGE: 0
NAUSEA: 0
CONSTIPATION: 1
ABDOMINAL DISTENTION: 0
BLOOD IN STOOL: 0
EYE REDNESS: 0
CHEST TIGHTNESS: 0
TROUBLE SWALLOWING: 0
ANAL BLEEDING: 0
PHOTOPHOBIA: 0
EYE PAIN: 0

## 2023-07-25 NOTE — PROGRESS NOTES
Subjective:      Patient ID: Robbin Ferrara is a 60 y.o. male who presents today for:  Chief Complaint   Patient presents with    Follow-up       HPI  Patient came in as follow-up to open access colonoscopy, colonoscopy was notable for multiple adenomatous colon polyps and suboptimal prep with formed stool on the right side of the colon patient has recommendations for repeat in 6 months. He did complete his prep as prescribed, does struggle with constipation however since his colonoscopy reports improved bowel habits, no blood no mucus, no abdominal pain, no unintentional weight loss. Past Medical History:   Diagnosis Date    Hypertension      Past Surgical History:   Procedure Laterality Date    APPENDECTOMY      COLONOSCOPY N/A 7/11/2023    COLORECTAL CANCER SCREENING, NOT HIGH RISK performed by Brandan Wise MD at [address] History     Socioeconomic History    Marital status: Single     Spouse name: Not on file    Number of children: Not on file    Years of education: Not on file    Highest education level: Not on file   Occupational History    Not on file   Tobacco Use    Smoking status: Never    Smokeless tobacco: Never   Vaping Use    Vaping Use: Never used   Substance and Sexual Activity    Alcohol use: Never    Drug use: Never    Sexual activity: Not on file   Other Topics Concern    Not on file   Social History Narrative    Not on file     Social Determinants of Health     Financial Resource Strain: Low Risk     Difficulty of Paying Living Expenses: Not hard at all   Food Insecurity: No Food Insecurity    Worried About Running Out of Food in the Last Year: Never true    [address] in the Last Year: Never true   Transportation Needs: Unknown    Lack of Transportation (Medical): Not on file    Lack of Transportation (Non-Medical):  No   Physical Activity: Not on file   Stress: Not on file   Social Connections: Not on file   Intimate Partner Violence: Not on file   Housing

## 2023-08-10 PROBLEM — Z12.11 COLON CANCER SCREENING: Status: RESOLVED | Noted: 2023-07-11 | Resolved: 2023-08-10

## 2023-11-13 ENCOUNTER — OFFICE VISIT (OUTPATIENT)
Dept: FAMILY MEDICINE CLINIC | Age: 63
End: 2023-11-13
Payer: MEDICARE

## 2023-11-13 VITALS
BODY MASS INDEX: 29.1 KG/M2 | HEIGHT: 68 IN | OXYGEN SATURATION: 98 % | DIASTOLIC BLOOD PRESSURE: 82 MMHG | SYSTOLIC BLOOD PRESSURE: 110 MMHG | WEIGHT: 192 LBS | TEMPERATURE: 97.9 F | HEART RATE: 75 BPM

## 2023-11-13 DIAGNOSIS — N28.9 RENAL INSUFFICIENCY: ICD-10-CM

## 2023-11-13 DIAGNOSIS — Z23 FLU VACCINE NEED: ICD-10-CM

## 2023-11-13 DIAGNOSIS — R60.0 BILATERAL LEG EDEMA: ICD-10-CM

## 2023-11-13 DIAGNOSIS — E55.9 VITAMIN D DEFICIENCY: ICD-10-CM

## 2023-11-13 DIAGNOSIS — K63.5 POLYP OF COLON, UNSPECIFIED PART OF COLON, UNSPECIFIED TYPE: ICD-10-CM

## 2023-11-13 DIAGNOSIS — I10 BENIGN ESSENTIAL HTN: ICD-10-CM

## 2023-11-13 DIAGNOSIS — N28.9 RENAL INSUFFICIENCY: Primary | ICD-10-CM

## 2023-11-13 LAB
ANION GAP SERPL CALCULATED.3IONS-SCNC: 9 MEQ/L (ref 9–15)
BUN SERPL-MCNC: 15 MG/DL (ref 8–23)
CALCIUM SERPL-MCNC: 9.3 MG/DL (ref 8.5–9.9)
CHLORIDE SERPL-SCNC: 103 MEQ/L (ref 95–107)
CO2 SERPL-SCNC: 23 MEQ/L (ref 20–31)
CREAT SERPL-MCNC: 1.15 MG/DL (ref 0.7–1.2)
GLUCOSE SERPL-MCNC: 85 MG/DL (ref 70–99)
POTASSIUM SERPL-SCNC: 4.3 MEQ/L (ref 3.4–4.9)
SODIUM SERPL-SCNC: 135 MEQ/L (ref 135–144)

## 2023-11-13 PROCEDURE — 99214 OFFICE O/P EST MOD 30 MIN: CPT | Performed by: FAMILY MEDICINE

## 2023-11-13 PROCEDURE — 3079F DIAST BP 80-89 MM HG: CPT | Performed by: FAMILY MEDICINE

## 2023-11-13 PROCEDURE — 90674 CCIIV4 VAC NO PRSV 0.5 ML IM: CPT | Performed by: FAMILY MEDICINE

## 2023-11-13 PROCEDURE — 3074F SYST BP LT 130 MM HG: CPT | Performed by: FAMILY MEDICINE

## 2023-11-13 PROCEDURE — G0008 ADMIN INFLUENZA VIRUS VAC: HCPCS | Performed by: FAMILY MEDICINE

## 2023-11-13 ASSESSMENT — ENCOUNTER SYMPTOMS
PHOTOPHOBIA: 0
ABDOMINAL PAIN: 0
ABDOMINAL DISTENTION: 0
SHORTNESS OF BREATH: 0
CHEST TIGHTNESS: 0

## 2023-11-13 NOTE — PATIENT INSTRUCTIONS
Blood pressure under good control. Patient is working on weight loss at this time with diet and activity. encouraged patient to keep his colonoscopy appointment as he had sessile polyp that were pretty large. Patient will have blood work today to monitor his medical conditions. No change in management of medications.     Follow-up appointment in 6 months for medical conditions

## 2023-11-14 LAB — VITAMIN D 25-HYDROXY: 39.2 NG/ML (ref 30–100)

## 2023-11-25 DIAGNOSIS — E55.9 VITAMIN D DEFICIENCY: ICD-10-CM

## 2023-11-26 NOTE — TELEPHONE ENCOUNTER
Comments: Please review    Last Office Visit (last PCP visit):   11/13/2023    Next Visit Date:  Future Appointments   Date Time Provider [facility] [address]   1/26/2024  8:15 AM Kenny Coats APRN - [address]       **If hasn't been seen in over a year OR hasn't followed up according to last diabetes/ADHD visit, make appointment for patient before sending refill to provider.     Rx requested:  Requested Prescriptions     Pending Prescriptions Disp Refills    vitamin D (ERGOCALCIFEROL) 1.25 MG (64258 UT) CAPS capsule [Pharmacy Med Name: VITAMIN D2 1.25MG(50,000 UNIT)] 13 capsule 3     Sig: TAKE ONE CAPSULE BY MOUTH ONCE A WEEK

## 2023-11-29 RX ORDER — ERGOCALCIFEROL 1.25 MG/1
50000 CAPSULE ORAL WEEKLY
Qty: 13 CAPSULE | Refills: 3 | Status: SHIPPED | OUTPATIENT
Start: 2023-11-29

## 2024-01-05 ENCOUNTER — PREP FOR PROCEDURE (OUTPATIENT)
Dept: GASTROENTEROLOGY | Age: 64
End: 2024-01-05

## 2024-01-08 RX ORDER — SODIUM CHLORIDE 9 MG/ML
INJECTION, SOLUTION INTRAVENOUS PRN
Status: CANCELLED | OUTPATIENT
Start: 2024-01-08

## 2024-01-08 RX ORDER — SODIUM CHLORIDE 0.9 % (FLUSH) 0.9 %
5-40 SYRINGE (ML) INJECTION PRN
Status: CANCELLED | OUTPATIENT
Start: 2024-01-08

## 2024-01-08 RX ORDER — SODIUM CHLORIDE 0.9 % (FLUSH) 0.9 %
5-40 SYRINGE (ML) INJECTION EVERY 12 HOURS SCHEDULED
Status: CANCELLED | OUTPATIENT
Start: 2024-01-08

## 2024-01-08 RX ORDER — SODIUM CHLORIDE 9 MG/ML
INJECTION, SOLUTION INTRAVENOUS CONTINUOUS
Status: CANCELLED | OUTPATIENT
Start: 2024-01-08

## 2024-03-20 ENCOUNTER — OFFICE VISIT (OUTPATIENT)
Dept: FAMILY MEDICINE CLINIC | Age: 64
End: 2024-03-20
Payer: MEDICARE

## 2024-03-20 VITALS
SYSTOLIC BLOOD PRESSURE: 134 MMHG | HEIGHT: 68 IN | BODY MASS INDEX: 28.79 KG/M2 | DIASTOLIC BLOOD PRESSURE: 68 MMHG | OXYGEN SATURATION: 97 % | WEIGHT: 190 LBS | HEART RATE: 103 BPM | TEMPERATURE: 98.4 F

## 2024-03-20 DIAGNOSIS — I10 BENIGN ESSENTIAL HTN: ICD-10-CM

## 2024-03-20 DIAGNOSIS — M54.17 LUMBOSACRAL RADICULOPATHY: ICD-10-CM

## 2024-03-20 DIAGNOSIS — N28.9 RENAL INSUFFICIENCY: ICD-10-CM

## 2024-03-20 DIAGNOSIS — Z51.81 ENCOUNTER FOR MEDICATION MONITORING: ICD-10-CM

## 2024-03-20 DIAGNOSIS — Z00.00 INITIAL MEDICARE ANNUAL WELLNESS VISIT: Primary | ICD-10-CM

## 2024-03-20 DIAGNOSIS — Z12.5 PROSTATE CANCER SCREENING: ICD-10-CM

## 2024-03-20 PROCEDURE — 99214 OFFICE O/P EST MOD 30 MIN: CPT | Performed by: FAMILY MEDICINE

## 2024-03-20 PROCEDURE — 3078F DIAST BP <80 MM HG: CPT | Performed by: FAMILY MEDICINE

## 2024-03-20 PROCEDURE — G0438 PPPS, INITIAL VISIT: HCPCS | Performed by: FAMILY MEDICINE

## 2024-03-20 PROCEDURE — 3075F SYST BP GE 130 - 139MM HG: CPT | Performed by: FAMILY MEDICINE

## 2024-03-20 RX ORDER — MELOXICAM 15 MG/1
15 TABLET ORAL DAILY
Qty: 30 TABLET | Refills: 11 | Status: SHIPPED | OUTPATIENT
Start: 2024-03-20

## 2024-03-20 ASSESSMENT — PATIENT HEALTH QUESTIONNAIRE - PHQ9
SUM OF ALL RESPONSES TO PHQ QUESTIONS 1-9: 0
1. LITTLE INTEREST OR PLEASURE IN DOING THINGS: NOT AT ALL
SUM OF ALL RESPONSES TO PHQ QUESTIONS 1-9: 0
SUM OF ALL RESPONSES TO PHQ QUESTIONS 1-9: 0
SUM OF ALL RESPONSES TO PHQ9 QUESTIONS 1 & 2: 0
SUM OF ALL RESPONSES TO PHQ QUESTIONS 1-9: 0

## 2024-03-20 ASSESSMENT — LIFESTYLE VARIABLES
HOW MANY STANDARD DRINKS CONTAINING ALCOHOL DO YOU HAVE ON A TYPICAL DAY: PATIENT DOES NOT DRINK
HOW OFTEN DO YOU HAVE A DRINK CONTAINING ALCOHOL: NEVER

## 2024-03-20 NOTE — PATIENT INSTRUCTIONS
Patient will have his labs done sometime in the next couple weeks monitoring renal function, screening thyroid and prostate.    Lipids were done 2022 normal.    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 You may feel fatigue because of too much or not enough activity. It can also come from stress, lack of sleep, boredom, and poor diet. Many medical problems, such as viral infections, can cause fatigue. Emotional problems, especially depression, are often the cause of fatigue.  Fatigue is most often a symptom of another problem. Treatment for fatigue depends on the cause. For example, if you have fatigue because you have a certain health problem, treating this problem also treats your fatigue. If depression or anxiety is the cause, treatment may help.  Follow-up care is a key part of your treatment and safety. Be sure to make and go to all appointments, and call your doctor if you are having problems. It's also a good idea to know your test results and keep a list of the medicines you take.  How can you care for yourself at home?  Get regular exercise. But try not to overdo it. It may help to go back and forth between rest and exercise.  Get plenty of rest.  Eat a variety of healthy foods. Try not to skip any meals.  Avoid or try to cut back on your use of caffeine, tobacco, and alcohol. Caffeine is most often found in coffee, tea, cola drinks, and energy drinks.  Limit medicines that can cause fatigue. These include medicines such as cold and allergy medicines.  When should you call for help?  Watch closely for changes in your health, and be sure to contact your doctor if:    You have new symptoms such as fever or a rash.     Your fatigue gets worse.     You have been feeling down, depressed, or hopeless. Or you may have lost interest in

## 2024-03-20 NOTE — PROGRESS NOTES
Medicare Annual Wellness Visit    Nitesh Garcia is here for Medicare AWV and Health Maintenance (Pt seeing gastro for colonoscopy)    Assessment & Plan   Initial Medicare annual wellness visit  Encounter for medication monitoring  -     Basic Metabolic Panel; Future  Benign essential HTN  -     TSH with Reflex; Future  Renal insufficiency  -     Basic Metabolic Panel; Future  Prostate cancer screening  -     PSA Screening; Future  Lumbosacral radiculopathy  -     meloxicam (MOBIC) 15 MG tablet; Take 1 tablet by mouth daily, Disp-30 tablet, R-11Normal  Recommendations for Preventive Services Due: see orders and patient instructions/AVS.  Recommended screening schedule for the next 5-10 years is provided to the patient in written form: see Patient Instructions/AVS.     Return in about 1 year (around 3/20/2025) for MAW exam due, also bp appt 6 month.     Subjective   The following acute and/or chronic problems were also addressed today:  Noting some back spasm with sciatica pain and jumping in his R leg that occurs some nights.  Takes tizanidine only on occasion.  Discussed benefits of medication and how to use    Patient's complete Health Risk Assessment and screening values have been reviewed and are found in Flowsheets. The following problems were reviewed today and where indicated follow up appointments were made and/or referrals ordered.    Positive Risk Factor Screenings with Interventions:               General HRA Questions:  Select all that apply: (!) New or Increased Pain, New or Increased Fatigue (right side leg)    Pain Interventions:  See above    Fatigue Interventions:  See above            Advanced Directives:  Do you have a Living Will?: (!) No    Intervention:                       Objective   Vitals:    03/20/24 1657   BP: 134/68   Pulse: (!) 103   Temp: 98.4 °F (36.9 °C)   SpO2: 97%   Weight: 86.2 kg (190 lb)   Height: 1.727 m (5' 8\")      Body mass index is 28.89 kg/m².        Physical Exam  Vitals

## 2024-03-28 DIAGNOSIS — N28.9 RENAL INSUFFICIENCY: ICD-10-CM

## 2024-03-28 DIAGNOSIS — Z12.5 PROSTATE CANCER SCREENING: ICD-10-CM

## 2024-03-28 DIAGNOSIS — Z51.81 ENCOUNTER FOR MEDICATION MONITORING: ICD-10-CM

## 2024-03-28 DIAGNOSIS — I10 BENIGN ESSENTIAL HTN: ICD-10-CM

## 2024-03-28 LAB
ANION GAP SERPL CALCULATED.3IONS-SCNC: 15 MEQ/L (ref 9–15)
BUN SERPL-MCNC: 19 MG/DL (ref 8–23)
CALCIUM SERPL-MCNC: 9.3 MG/DL (ref 8.5–9.9)
CHLORIDE SERPL-SCNC: 111 MEQ/L (ref 95–107)
CO2 SERPL-SCNC: 20 MEQ/L (ref 20–31)
CREAT SERPL-MCNC: 1.15 MG/DL (ref 0.7–1.2)
GLUCOSE SERPL-MCNC: 90 MG/DL (ref 70–99)
POTASSIUM SERPL-SCNC: 4.5 MEQ/L (ref 3.4–4.9)
PSA SERPL-MCNC: 1.97 NG/ML (ref 0–4)
SODIUM SERPL-SCNC: 146 MEQ/L (ref 135–144)
TSH REFLEX: 2.64 UIU/ML (ref 0.44–3.86)

## 2024-04-04 DIAGNOSIS — E87.0 HYPERNATREMIA: Primary | ICD-10-CM

## 2024-04-18 DIAGNOSIS — E87.0 HYPERNATREMIA: ICD-10-CM

## 2024-04-18 LAB
ANION GAP SERPL CALCULATED.3IONS-SCNC: 12 MEQ/L (ref 9–15)
BUN SERPL-MCNC: 18 MG/DL (ref 8–23)
CALCIUM SERPL-MCNC: 9.2 MG/DL (ref 8.5–9.9)
CHLORIDE SERPL-SCNC: 108 MEQ/L (ref 95–107)
CO2 SERPL-SCNC: 20 MEQ/L (ref 20–31)
CREAT SERPL-MCNC: 1.21 MG/DL (ref 0.7–1.2)
GLUCOSE SERPL-MCNC: 88 MG/DL (ref 70–99)
POTASSIUM SERPL-SCNC: 4.1 MEQ/L (ref 3.4–4.9)
SODIUM SERPL-SCNC: 140 MEQ/L (ref 135–144)

## 2024-09-04 ENCOUNTER — OFFICE VISIT (OUTPATIENT)
Dept: FAMILY MEDICINE CLINIC | Age: 64
End: 2024-09-04
Payer: MEDICARE

## 2024-09-04 ENCOUNTER — PATIENT MESSAGE (OUTPATIENT)
Dept: FAMILY MEDICINE CLINIC | Age: 64
End: 2024-09-04

## 2024-09-04 VITALS
HEART RATE: 85 BPM | WEIGHT: 190 LBS | SYSTOLIC BLOOD PRESSURE: 136 MMHG | DIASTOLIC BLOOD PRESSURE: 70 MMHG | BODY MASS INDEX: 28.79 KG/M2 | OXYGEN SATURATION: 98 % | HEIGHT: 68 IN

## 2024-09-04 DIAGNOSIS — M54.17 LUMBOSACRAL RADICULOPATHY: ICD-10-CM

## 2024-09-04 DIAGNOSIS — Z13.1 SCREENING FOR DIABETES MELLITUS: ICD-10-CM

## 2024-09-04 DIAGNOSIS — N28.9 RENAL INSUFFICIENCY: ICD-10-CM

## 2024-09-04 DIAGNOSIS — E55.9 VITAMIN D DEFICIENCY: ICD-10-CM

## 2024-09-04 DIAGNOSIS — I10 BENIGN ESSENTIAL HTN: ICD-10-CM

## 2024-09-04 DIAGNOSIS — I10 BENIGN ESSENTIAL HTN: Primary | ICD-10-CM

## 2024-09-04 DIAGNOSIS — L74.9 SWEATING ABNORMALITY: ICD-10-CM

## 2024-09-04 DIAGNOSIS — G44.209 MUSCLE TENSION HEADACHE: ICD-10-CM

## 2024-09-04 LAB
ALBUMIN SERPL-MCNC: 4 G/DL (ref 3.5–4.6)
ALP SERPL-CCNC: 127 U/L (ref 35–104)
ALT SERPL-CCNC: 14 U/L (ref 0–41)
ANION GAP SERPL CALCULATED.3IONS-SCNC: 9 MEQ/L (ref 9–15)
AST SERPL-CCNC: 13 U/L (ref 0–40)
BASOPHILS # BLD: 0.1 K/UL (ref 0–0.2)
BASOPHILS NFR BLD: 0.9 %
BILIRUB SERPL-MCNC: 0.4 MG/DL (ref 0.2–0.7)
BUN SERPL-MCNC: 17 MG/DL (ref 8–23)
CALCIUM SERPL-MCNC: 9.1 MG/DL (ref 8.5–9.9)
CHLORIDE SERPL-SCNC: 106 MEQ/L (ref 95–107)
CHOLEST SERPL-MCNC: 172 MG/DL (ref 0–199)
CO2 SERPL-SCNC: 23 MEQ/L (ref 20–31)
CREAT SERPL-MCNC: 1.03 MG/DL (ref 0.7–1.2)
EOSINOPHIL # BLD: 0.1 K/UL (ref 0–0.7)
EOSINOPHIL NFR BLD: 2.7 %
ERYTHROCYTE [DISTWIDTH] IN BLOOD BY AUTOMATED COUNT: 13.3 % (ref 11.5–14.5)
GLOBULIN SER CALC-MCNC: 3.1 G/DL (ref 2.3–3.5)
GLUCOSE SERPL-MCNC: 90 MG/DL (ref 70–99)
HCT VFR BLD AUTO: 43.9 % (ref 42–52)
HDLC SERPL-MCNC: 32 MG/DL (ref 40–59)
HGB BLD-MCNC: 14.8 G/DL (ref 14–18)
LDLC SERPL CALC-MCNC: 124 MG/DL (ref 0–129)
LYMPHOCYTES # BLD: 1.2 K/UL (ref 1–4.8)
LYMPHOCYTES NFR BLD: 23.3 %
MCH RBC QN AUTO: 31.2 PG (ref 27–31.3)
MCHC RBC AUTO-ENTMCNC: 33.7 % (ref 33–37)
MCV RBC AUTO: 92.6 FL (ref 79–92.2)
MONOCYTES # BLD: 0.5 K/UL (ref 0.2–0.8)
MONOCYTES NFR BLD: 9.1 %
NEUTROPHILS # BLD: 3.4 K/UL (ref 1.4–6.5)
NEUTS SEG NFR BLD: 63.6 %
PLATELET # BLD AUTO: 267 K/UL (ref 130–400)
POTASSIUM SERPL-SCNC: 4.5 MEQ/L (ref 3.4–4.9)
PROT SERPL-MCNC: 7.1 G/DL (ref 6.3–8)
RBC # BLD AUTO: 4.74 M/UL (ref 4.7–6.1)
SODIUM SERPL-SCNC: 138 MEQ/L (ref 135–144)
TRIGL SERPL-MCNC: 78 MG/DL (ref 0–150)
TSH SERPL-MCNC: 1.74 UIU/ML (ref 0.44–3.86)
WBC # BLD AUTO: 5.3 K/UL (ref 4.8–10.8)

## 2024-09-04 PROCEDURE — 99214 OFFICE O/P EST MOD 30 MIN: CPT | Performed by: FAMILY MEDICINE

## 2024-09-04 PROCEDURE — 3078F DIAST BP <80 MM HG: CPT | Performed by: FAMILY MEDICINE

## 2024-09-04 PROCEDURE — 3075F SYST BP GE 130 - 139MM HG: CPT | Performed by: FAMILY MEDICINE

## 2024-09-04 SDOH — ECONOMIC STABILITY: FOOD INSECURITY: WITHIN THE PAST 12 MONTHS, THE FOOD YOU BOUGHT JUST DIDN'T LAST AND YOU DIDN'T HAVE MONEY TO GET MORE.: NEVER TRUE

## 2024-09-04 SDOH — ECONOMIC STABILITY: FOOD INSECURITY: WITHIN THE PAST 12 MONTHS, YOU WORRIED THAT YOUR FOOD WOULD RUN OUT BEFORE YOU GOT MONEY TO BUY MORE.: NEVER TRUE

## 2024-09-04 SDOH — ECONOMIC STABILITY: INCOME INSECURITY: HOW HARD IS IT FOR YOU TO PAY FOR THE VERY BASICS LIKE FOOD, HOUSING, MEDICAL CARE, AND HEATING?: NOT HARD AT ALL

## 2024-09-04 ASSESSMENT — ENCOUNTER SYMPTOMS
ABDOMINAL PAIN: 0
CHEST TIGHTNESS: 0
SHORTNESS OF BREATH: 0
NAUSEA: 1
PHOTOPHOBIA: 0
ABDOMINAL DISTENTION: 0

## 2024-09-04 NOTE — PATIENT INSTRUCTIONS
Discussed the patient might be having brief hypotensive episodes if that is these are occurring at a time of day where he is recently taken his medication and that it starts to be very effective.  As long as the episodes are only taking a few seconds to pass and are not affiliated with any passing out no further action at this time.    Continue current medications for medical conditions.  Medical condition monitoring and health maintenance labs ordered.    Any worsening of the sweating lightheaded symptoms patient should go to the emergency room.

## 2024-09-04 NOTE — PROGRESS NOTES
eye: No discharge.         Left eye: No discharge.      Conjunctiva/sclera: Conjunctivae normal.      Pupils: Pupils are equal, round, and reactive to light.   Neck:      Thyroid: No thyromegaly.   Cardiovascular:      Rate and Rhythm: Normal rate and regular rhythm.      Heart sounds: Normal heart sounds.   Pulmonary:      Effort: Pulmonary effort is normal. No respiratory distress.      Breath sounds: Normal breath sounds.   Abdominal:      General: There is no distension.   Musculoskeletal:      Cervical back: Neck supple.   Skin:     General: Skin is warm and dry.   Neurological:      Mental Status: He is alert and oriented to person, place, and time.      Coordination: Coordination normal.   Psychiatric:         Thought Content: Thought content normal.         Judgment: Judgment normal.         No results found for this visit on 09/04/24.    No results found for this or any previous visit (from the past 2016 hour(s)).    [] Pt was seen by provider for      Minutes  Counseling and coordination of care was done for all assessment diagnosis listed for today with patient and any family/friend present.   More than 50% of this visit was spent coordinating current care, obtaining information for prior records, and counseling for current plan of action.           Assessment:       Diagnosis Orders   1. Benign essential HTN  Lipid, Fasting    TSH with Reflex      2. Sweating abnormality  Comprehensive Metabolic Panel    CBC with Auto Differential      3. Muscle tension headache        4. Renal insufficiency  Comprehensive Metabolic Panel      5. Vitamin D deficiency  Vitamin D 25 Hydroxy      6. Screening for diabetes mellitus  Comprehensive Metabolic Panel            Orders Placed This Encounter   Procedures    Lipid, Fasting     Standing Status:   Future     Number of Occurrences:   1     Standing Expiration Date:   9/4/2025    TSH with Reflex     Standing Status:   Future     Number of Occurrences:   1     Standing

## 2024-09-05 LAB — VITAMIN D 25-HYDROXY: 41.5 NG/ML (ref 30–100)

## 2024-09-05 RX ORDER — MELOXICAM 15 MG/1
15 TABLET ORAL DAILY
Qty: 30 TABLET | Refills: 11 | Status: SHIPPED | OUTPATIENT
Start: 2024-09-05

## 2024-09-05 RX ORDER — ERGOCALCIFEROL 1.25 MG/1
50000 CAPSULE, LIQUID FILLED ORAL WEEKLY
Qty: 13 CAPSULE | Refills: 3 | Status: SHIPPED | OUTPATIENT
Start: 2024-09-05

## 2024-09-05 RX ORDER — METOPROLOL SUCCINATE 50 MG/1
50 TABLET, EXTENDED RELEASE ORAL DAILY
Qty: 90 TABLET | Refills: 4 | Status: SHIPPED | OUTPATIENT
Start: 2024-09-05 | End: 2024-09-05

## 2024-09-05 NOTE — TELEPHONE ENCOUNTER
Comments:     Last Office Visit (last PCP visit):   9/4/2024    Next Visit Date:  Future Appointments   Date Time Provider Department Center   3/5/2025  9:45 AM Shubham Reyes MD [facility] DEP   3/24/2025  9:30 AM Shubham Reyes MD [facility] DE[facility]       **If hasn't been seen in over a year OR hasn't followed up according to last diabetes/ADHD visit, make appointment for patient before sending refill to provider.    Rx requested:  Requested Prescriptions     Pending Prescriptions Disp Refills    vitamin D (ERGOCALCIFEROL) 1.25 MG (40892 UT) CAPS capsule 13 capsule 3     Sig: Take 1 capsule by mouth Once a week at 5 PM    meloxicam (MOBIC) 15 MG tablet 30 tablet 11     Sig: Take 1 tablet by mouth daily

## 2024-09-05 NOTE — RESULT ENCOUNTER NOTE
Result reviewed.  Including the other labs that are not in this note.  Notify pt normal. No further action at this time.  If the patient notices a value in labs that is flagged as abnormal and I am not commenting on it that is because it is medically insignificant.  It does not follow a pattern that adds up to a medical problem.

## 2024-09-06 RX ORDER — METOPROLOL SUCCINATE 50 MG/1
50 TABLET, EXTENDED RELEASE ORAL DAILY
Qty: 90 TABLET | Refills: 4 | Status: SHIPPED | OUTPATIENT
Start: 2024-09-06

## 2025-02-24 ENCOUNTER — OFFICE VISIT (OUTPATIENT)
Dept: FAMILY MEDICINE CLINIC | Age: 65
End: 2025-02-24

## 2025-02-24 VITALS
HEART RATE: 73 BPM | TEMPERATURE: 97.5 F | OXYGEN SATURATION: 97 % | HEIGHT: 68 IN | DIASTOLIC BLOOD PRESSURE: 74 MMHG | SYSTOLIC BLOOD PRESSURE: 140 MMHG | WEIGHT: 198 LBS | BODY MASS INDEX: 30.01 KG/M2

## 2025-02-24 DIAGNOSIS — J01.90 ACUTE BACTERIAL SINUSITIS: Primary | ICD-10-CM

## 2025-02-24 DIAGNOSIS — B96.89 ACUTE BACTERIAL SINUSITIS: Primary | ICD-10-CM

## 2025-02-24 PROCEDURE — 3077F SYST BP >= 140 MM HG: CPT | Performed by: NURSE PRACTITIONER

## 2025-02-24 PROCEDURE — 99213 OFFICE O/P EST LOW 20 MIN: CPT | Performed by: NURSE PRACTITIONER

## 2025-02-24 PROCEDURE — 1123F ACP DISCUSS/DSCN MKR DOCD: CPT | Performed by: NURSE PRACTITIONER

## 2025-02-24 PROCEDURE — 3078F DIAST BP <80 MM HG: CPT | Performed by: NURSE PRACTITIONER

## 2025-02-24 RX ORDER — CETIRIZINE HYDROCHLORIDE 10 MG/1
10 TABLET ORAL DAILY
Qty: 30 TABLET | Refills: 0 | Status: SHIPPED | OUTPATIENT
Start: 2025-02-24

## 2025-02-24 SDOH — ECONOMIC STABILITY: FOOD INSECURITY: WITHIN THE PAST 12 MONTHS, YOU WORRIED THAT YOUR FOOD WOULD RUN OUT BEFORE YOU GOT MONEY TO BUY MORE.: NEVER TRUE

## 2025-02-24 SDOH — ECONOMIC STABILITY: FOOD INSECURITY: WITHIN THE PAST 12 MONTHS, THE FOOD YOU BOUGHT JUST DIDN'T LAST AND YOU DIDN'T HAVE MONEY TO GET MORE.: NEVER TRUE

## 2025-02-24 ASSESSMENT — ENCOUNTER SYMPTOMS
SHORTNESS OF BREATH: 1
EYE REDNESS: 0
EYE DISCHARGE: 1
COUGH: 1
VOMITING: 0
EYE ITCHING: 0
WHEEZING: 0
NAUSEA: 0
SINUS PAIN: 1
RHINORRHEA: 0
ABDOMINAL PAIN: 0
EYE PAIN: 0
SINUS PRESSURE: 1
SORE THROAT: 0
DIARRHEA: 0

## 2025-02-24 ASSESSMENT — PATIENT HEALTH QUESTIONNAIRE - PHQ9
SUM OF ALL RESPONSES TO PHQ QUESTIONS 1-9: 0
SUM OF ALL RESPONSES TO PHQ9 QUESTIONS 1 & 2: 0
SUM OF ALL RESPONSES TO PHQ QUESTIONS 1-9: 0
2. FEELING DOWN, DEPRESSED OR HOPELESS: NOT AT ALL
1. LITTLE INTEREST OR PLEASURE IN DOING THINGS: NOT AT ALL

## 2025-02-24 NOTE — PROGRESS NOTES
Subjective  Nitesh Garcia, 64 y.o. male presents today with:  Chief Complaint   Patient presents with    Chest Congestion     With nasal drain body ache x 1 week taking tylenol otc        HPI  Sick for a week   Getting worse  Worse head cold  Starts to clear up  Called off work for a week  It was hard to breath  Nose bleeding was about 3 times had to have nose cauterized previously   Nasal congestion thick white   Sinuses  HA  Extra strength tylenol b12 mobic does help with symptoms   Review of Systems   Constitutional:  Negative for appetite change, chills, fatigue and fever.   HENT:  Positive for congestion, postnasal drip, sinus pressure and sinus pain (frontal). Negative for ear pain, rhinorrhea and sore throat.    Eyes:  Positive for discharge (watery). Negative for pain, redness and itching.   Respiratory:  Positive for cough and shortness of breath (with coughing). Negative for wheezing.    Cardiovascular:  Negative for chest pain and palpitations.   Gastrointestinal:  Negative for abdominal pain, diarrhea, nausea and vomiting.   Musculoskeletal:  Positive for myalgias (from weather).   Skin:  Negative for rash.   Neurological:  Positive for headaches.       Past Medical History:   Diagnosis Date    Hypertension      Past Surgical History:   Procedure Laterality Date    APPENDECTOMY      COLONOSCOPY N/A 7/11/2023    COLORECTAL CANCER SCREENING, NOT HIGH RISK performed by Ryann Meyer MD at [facility]     Social History     Socioeconomic History    Marital status: Single     Spouse name: Not on file    Number of children: Not on file    Years of education: Not on file    Highest education level: Not on file   Occupational History    Not on file   Tobacco Use    Smoking status: Never    Smokeless tobacco: Never   Vaping Use    Vaping status: Never Used   Substance and Sexual Activity    Alcohol use: Never    Drug use: Never    Sexual activity: Not on file   Other Topics Concern    Not on file

## 2025-02-25 ENCOUNTER — TELEPHONE (OUTPATIENT)
Dept: FAMILY MEDICINE CLINIC | Age: 65
End: 2025-02-25

## 2025-02-25 NOTE — TELEPHONE ENCOUNTER
Pt mother calling because pt needs a return to work letter.  Her returned to work today and was off 2/24/2025.  He will be coming in tomorrow to get.

## 2025-03-05 ENCOUNTER — OFFICE VISIT (OUTPATIENT)
Dept: FAMILY MEDICINE CLINIC | Age: 65
End: 2025-03-05
Payer: COMMERCIAL

## 2025-03-05 VITALS
TEMPERATURE: 98.2 F | WEIGHT: 196.8 LBS | SYSTOLIC BLOOD PRESSURE: 130 MMHG | HEIGHT: 68 IN | HEART RATE: 65 BPM | DIASTOLIC BLOOD PRESSURE: 76 MMHG | OXYGEN SATURATION: 95 % | BODY MASS INDEX: 29.83 KG/M2

## 2025-03-05 DIAGNOSIS — I10 BENIGN ESSENTIAL HTN: Primary | ICD-10-CM

## 2025-03-05 DIAGNOSIS — Z12.5 PROSTATE CANCER SCREENING: ICD-10-CM

## 2025-03-05 DIAGNOSIS — M54.12 CERVICAL RADICULOPATHY: ICD-10-CM

## 2025-03-05 DIAGNOSIS — I10 BENIGN ESSENTIAL HTN: ICD-10-CM

## 2025-03-05 DIAGNOSIS — M54.17 LUMBOSACRAL RADICULOPATHY: ICD-10-CM

## 2025-03-05 DIAGNOSIS — Z86.0101 HX OF ADENOMATOUS COLONIC POLYPS: ICD-10-CM

## 2025-03-05 PROBLEM — K63.5 POLYP OF COLON: Status: RESOLVED | Noted: 2023-07-11 | Resolved: 2025-03-05

## 2025-03-05 LAB
ANION GAP SERPL CALCULATED.3IONS-SCNC: 12 MEQ/L (ref 9–15)
BUN SERPL-MCNC: 19 MG/DL (ref 8–23)
CALCIUM SERPL-MCNC: 9.4 MG/DL (ref 8.5–9.9)
CHLORIDE SERPL-SCNC: 106 MEQ/L (ref 95–107)
CO2 SERPL-SCNC: 20 MEQ/L (ref 20–31)
CREAT SERPL-MCNC: 1.18 MG/DL (ref 0.7–1.2)
GLUCOSE SERPL-MCNC: 90 MG/DL (ref 70–99)
POTASSIUM SERPL-SCNC: 4.6 MEQ/L (ref 3.4–4.9)
PSA SERPL-MCNC: 1.53 NG/ML (ref 0–4)
SODIUM SERPL-SCNC: 138 MEQ/L (ref 135–144)

## 2025-03-05 PROCEDURE — 3075F SYST BP GE 130 - 139MM HG: CPT | Performed by: FAMILY MEDICINE

## 2025-03-05 PROCEDURE — 1123F ACP DISCUSS/DSCN MKR DOCD: CPT | Performed by: FAMILY MEDICINE

## 2025-03-05 PROCEDURE — 99214 OFFICE O/P EST MOD 30 MIN: CPT | Performed by: FAMILY MEDICINE

## 2025-03-05 PROCEDURE — 3078F DIAST BP <80 MM HG: CPT | Performed by: FAMILY MEDICINE

## 2025-03-05 ASSESSMENT — ENCOUNTER SYMPTOMS
ABDOMINAL PAIN: 0
PHOTOPHOBIA: 0
SHORTNESS OF BREATH: 0
ABDOMINAL DISTENTION: 0
CHEST TIGHTNESS: 0

## 2025-03-05 NOTE — PROGRESS NOTES
Metabolic Panel      2. Lumbosacral radiculopathy        3. Prostate cancer screening  PSA Screening      4. Cervical radiculopathy        5. Hx of adenomatous colonic polyps              Orders Placed This Encounter   Procedures    PSA Screening     Standing Status:   Future     Standing Expiration Date:   3/5/2026    Basic Metabolic Panel     Standing Status:   Future     Standing Expiration Date:   3/5/2026       No orders of the defined types were placed in this encounter.         Medication List            Accurate as of March 5, 2025 10:25 AM. If you have any questions, ask your nurse or doctor.                CONTINUE taking these medications      ASPIRIN 81 PO     cetirizine 10 MG tablet  Commonly known as: ZYRTEC  Take 1 tablet by mouth daily     meloxicam 15 MG tablet  Commonly known as: MOBIC  Take 1 tablet by mouth daily     metoprolol succinate 50 MG extended release tablet  Commonly known as: TOPROL XL  Take 1 tablet by mouth daily     terazosin 5 MG capsule  Commonly known as: HYTRIN     tiZANidine 2 MG tablet  Commonly known as: ZANAFLEX     vitamin D 1.25 MG (35595 UT) Caps capsule  Commonly known as: ERGOCALCIFEROL  Take 1 capsule by mouth Once a week at 5 PM                Plan:   Return in about 6 months (around 9/5/2025) for for routine major medical condition management.    Patient Instructions   Patient doing well on current hypertensive medications no changes.    Discussed positional strategies for handling lumbar and cervical radiculopathy symptoms.    Patient readvised to schedule for colonoscopy and front office will help with transportation issues.    This note was partially created with the assistance of dictation.  This may lead to grammatical or spelling errors.      Shubham Reyes M.D.

## 2025-03-05 NOTE — PATIENT INSTRUCTIONS
Patient doing well on current hypertensive medications no changes.    Discussed positional strategies for handling lumbar and cervical radiculopathy symptoms.    Patient readvised to schedule for colonoscopy and front office will help with transportation issues.   Anesthesia Volume In Cc (Will Not Render If 0): 0.5

## 2025-03-26 ENCOUNTER — OFFICE VISIT (OUTPATIENT)
Dept: FAMILY MEDICINE CLINIC | Age: 65
End: 2025-03-26

## 2025-03-26 VITALS
OXYGEN SATURATION: 97 % | WEIGHT: 196.6 LBS | HEIGHT: 68 IN | TEMPERATURE: 98.1 F | HEART RATE: 77 BPM | BODY MASS INDEX: 29.8 KG/M2 | DIASTOLIC BLOOD PRESSURE: 82 MMHG | SYSTOLIC BLOOD PRESSURE: 136 MMHG

## 2025-03-26 DIAGNOSIS — Z00.00 MEDICARE ANNUAL WELLNESS VISIT, SUBSEQUENT: Primary | ICD-10-CM

## 2025-03-26 DIAGNOSIS — E55.9 VITAMIN D DEFICIENCY: ICD-10-CM

## 2025-03-26 RX ORDER — ERGOCALCIFEROL 1.25 MG/1
50000 CAPSULE, LIQUID FILLED ORAL WEEKLY
Qty: 13 CAPSULE | Refills: 3 | Status: SHIPPED | OUTPATIENT
Start: 2025-03-26

## 2025-03-26 ASSESSMENT — PATIENT HEALTH QUESTIONNAIRE - PHQ9
1. LITTLE INTEREST OR PLEASURE IN DOING THINGS: NOT AT ALL
SUM OF ALL RESPONSES TO PHQ QUESTIONS 1-9: 0
2. FEELING DOWN, DEPRESSED OR HOPELESS: NOT AT ALL
SUM OF ALL RESPONSES TO PHQ QUESTIONS 1-9: 0

## 2025-03-26 NOTE — PROGRESS NOTES
a ventilator.    Resuscitation:  \"In the event your heart stopped as a result of an underlying serious health condition, would you want attempts made to restart your heart, or would you prefer a natural death?\"  Yes, attempt to resuscitate.    treatment goals    Conversation Outcomes / Follow-Up Plan:  ACP incomplete - refer to ACP Clinical Specialist  Reviewed DNR/DNI and patient elects Full Code (Attempt Resuscitation)    Length of Voluntary ACP Conversation in minutes:  16 minutes    Shubham Reyes MD                 Intervention:  see ACP note                     Objective   Vitals:    03/26/25 0908   BP: 136/82   BP Site: Right Upper Arm   Patient Position: Sitting   BP Cuff Size: Medium Adult   Pulse: 77   Temp: 98.1 °F (36.7 °C)   TempSrc: Infrared   SpO2: 97%   Weight: 89.2 kg (196 lb 9.6 oz)   Height: 1.727 m (5' 8\")      Body mass index is 29.89 kg/m².      Physical Exam  Vitals reviewed.   Constitutional:       General: He is not in acute distress.     Appearance: He is well-developed.   HENT:      Head: Normocephalic and atraumatic.      Right Ear: External ear normal.      Left Ear: External ear normal.      Nose: Nose normal.   Eyes:      General:         Right eye: No discharge.         Left eye: No discharge.      Conjunctiva/sclera: Conjunctivae normal.      Pupils: Pupils are equal, round, and reactive to light.   Neck:      Thyroid: No thyromegaly.   Cardiovascular:      Rate and Rhythm: Normal rate and regular rhythm.      Heart sounds: Normal heart sounds.   Pulmonary:      Effort: Pulmonary effort is normal. No respiratory distress.      Breath sounds: Normal breath sounds.   Abdominal:      General: There is no distension.   Musculoskeletal:      Cervical back: Neck supple.   Skin:     General: Skin is warm and dry.      Comments: Occasional pedunculated fleshy lesions noted posterior torso less than 4 mm apiece   Neurological:      Mental Status: He is alert and oriented to person, place,

## 2025-03-27 ENCOUNTER — CLINICAL DOCUMENTATION (OUTPATIENT)
Dept: SPIRITUAL SERVICES | Age: 65
End: 2025-03-27

## 2025-03-27 NOTE — ACP (ADVANCE CARE PLANNING)
Advance Care Planning   Ambulatory ACP Specialist Patient Outreach    Date:  3/27/2025, 3/28/25    ACP Specialist:  Juhi Pérez LPN    Outreach call to patient in follow-up to ACP Specialist referral from:Shubham Reyes MD    [x] PCP  [] Provider   [] Ambulatory Care Management [] Other     For:                  [x] Advance Directive Assistance              [] Complete Portable DNR order              [] Complete POST/POLST/MOST              [] Code Status Discussion             [] Discuss Goals of Care             [] Early ACP Decision-Making              [] Other (Specify)    Date Referral Received:3/26/25    Next Step:   [] ACP scheduled conversation  [x] Outreach again in one week               [] Email / Mail ACP Info Sheets  [] Email / Mail Advance Directive   [] Closing referral.  Routing closure to referring provider/staff and to ACP Specialist .    [] Closure letter mailed to patient with invitation to contact ACP Specialist if / when ready.   [] Other (Specify here):       [x] At this time, Healthcare Decision Maker Is:     Primary Decision Maker: NARCISA ATKINSON - [facility] - [phone]          [] Primary agent named in scanned advance directive.    [x] Legal Next of Kin.     [] Unable to determine legal decision maker at this time.    Outreaches:       [x] 1st -  Date:  3/27/25               Intervention:  [x] Spoke with Patient   [] Left Voice mail [] Email / Mail    [] [email or web address]  [] Other (Specify) :     Outcomes:  Spoke with patient to discuss ACP.  Patient is interested in scheduling an appointment and asked for a return call tomorrow afternoon about 3:00 p.m. 3/28/25.  He stated that he gets his schedule early afternoon.  Will contact patient then.             [x] 2nd -  Date:  3/28/25               Intervention:  [x] Spoke with Patient  [] Left Voice mail [] Email / Mail    [] [email or web address]  [] Other (Specify) :              Outcomes:Spoke with patient to schedule appt.  There are no

## 2025-04-16 ENCOUNTER — CLINICAL DOCUMENTATION (OUTPATIENT)
Dept: SPIRITUAL SERVICES | Age: 65
End: 2025-04-16

## 2025-04-16 NOTE — ACP (ADVANCE CARE PLANNING)
Advance Care Planning    Advance Care Planning Clinical Specialist  Conversation Note    Date of ACP Conversation: 4/16/2025    ACP Clinical Specialist: KIM Overton    Referral Date: 03/26/2025    Received by: PCP    Conversation Conducted with: Patient with decision making capacity and pt's mother Eva.       Current Designated Decision Maker/s:    Primary Decision Maker: EVA LOPEZ - Parent - [phone]    Secondary Decision Maker: Óscar Lopez - Brother/Sister - [phone]      Care Preferences Communicated    Code Status:  If the patient's heart were to stop beating, in their present state of health, the patient's CPR Preference: Yes, attempt CPR    If their health worsens and it becomes clear that the chance of recovery is unlikely, the patient's CPR Preference: Yes, attempt CPR     Ventilator:  If the patient, in their present state of health, suddenly became very ill and unable to breathe on their own, the patient desires the use of a ventilator (intubation).    If their health worsens and it becomes clear that the chance of recovery is unlikely, the patient does not desire the use of a ventilator (intubation).    [x] Yes  [] No   Educated Patient / Decision Maker regarding differences between advance directives and portable DNR orders.    Conversation Summary: ACP conversation has been completed. The pt and his mother Eva were both present for this conversation via speaker phone. Pt was able to make his wants/needs known. Pt has selected his mother Eva as his primary agent and his brother Óscar as his secondary agent; this does align within the pt's LNOK hierarchy. Pt's AD documents were completed via [email or web address] during this conversation. Once these documents are rcvd/completed, they will be uploaded into his medical record. Pt is also asking that a copy of these documents be mailed to his confirmed home address, as he cannot print this independently. If this worker does not rcv the completed

## 2025-08-20 ENCOUNTER — OFFICE VISIT (OUTPATIENT)
Dept: FAMILY MEDICINE CLINIC | Age: 65
End: 2025-08-20
Payer: COMMERCIAL

## 2025-08-20 VITALS
HEIGHT: 68 IN | HEART RATE: 77 BPM | WEIGHT: 191 LBS | BODY MASS INDEX: 28.95 KG/M2 | OXYGEN SATURATION: 98 % | SYSTOLIC BLOOD PRESSURE: 120 MMHG | DIASTOLIC BLOOD PRESSURE: 80 MMHG

## 2025-08-20 DIAGNOSIS — G89.29 CHRONIC PAIN OF BOTH KNEES: Primary | ICD-10-CM

## 2025-08-20 DIAGNOSIS — I10 BENIGN ESSENTIAL HTN: ICD-10-CM

## 2025-08-20 DIAGNOSIS — M25.562 CHRONIC PAIN OF BOTH KNEES: Primary | ICD-10-CM

## 2025-08-20 DIAGNOSIS — M25.561 CHRONIC PAIN OF BOTH KNEES: Primary | ICD-10-CM

## 2025-08-20 PROCEDURE — 3074F SYST BP LT 130 MM HG: CPT | Performed by: FAMILY MEDICINE

## 2025-08-20 PROCEDURE — 99214 OFFICE O/P EST MOD 30 MIN: CPT | Performed by: FAMILY MEDICINE

## 2025-08-20 PROCEDURE — 3079F DIAST BP 80-89 MM HG: CPT | Performed by: FAMILY MEDICINE

## 2025-08-20 PROCEDURE — 1123F ACP DISCUSS/DSCN MKR DOCD: CPT | Performed by: FAMILY MEDICINE

## 2025-08-27 ENCOUNTER — OFFICE VISIT (OUTPATIENT)
Age: 65
End: 2025-08-27

## 2025-08-27 VITALS
SYSTOLIC BLOOD PRESSURE: 132 MMHG | DIASTOLIC BLOOD PRESSURE: 78 MMHG | OXYGEN SATURATION: 97 % | BODY MASS INDEX: 28.95 KG/M2 | HEIGHT: 68 IN | WEIGHT: 191 LBS | TEMPERATURE: 97.9 F | HEART RATE: 72 BPM

## 2025-08-27 DIAGNOSIS — M25.461 EFFUSION OF RIGHT KNEE JOINT: ICD-10-CM

## 2025-08-27 DIAGNOSIS — M17.11 PRIMARY OSTEOARTHRITIS OF RIGHT KNEE: ICD-10-CM

## 2025-08-27 DIAGNOSIS — M17.12 PRIMARY OSTEOARTHRITIS OF LEFT KNEE: Primary | ICD-10-CM

## 2025-08-27 DIAGNOSIS — M25.462 EFFUSION OF LEFT KNEE JOINT: ICD-10-CM

## 2025-08-27 RX ORDER — TRIAMCINOLONE ACETONIDE 40 MG/ML
80 INJECTION, SUSPENSION INTRA-ARTICULAR; INTRAMUSCULAR ONCE
Status: COMPLETED | OUTPATIENT
Start: 2025-08-27 | End: 2025-08-27

## 2025-08-27 RX ORDER — LIDOCAINE HYDROCHLORIDE 10 MG/ML
3 INJECTION, SOLUTION INFILTRATION; PERINEURAL ONCE
Status: COMPLETED | OUTPATIENT
Start: 2025-08-27 | End: 2025-08-27

## 2025-08-27 RX ADMIN — TRIAMCINOLONE ACETONIDE 80 MG: 40 INJECTION, SUSPENSION INTRA-ARTICULAR; INTRAMUSCULAR at 10:30

## 2025-08-27 RX ADMIN — LIDOCAINE HYDROCHLORIDE 3 ML: 10 INJECTION, SOLUTION INFILTRATION; PERINEURAL at 10:30

## 2025-08-27 ASSESSMENT — ENCOUNTER SYMPTOMS: BACK PAIN: 0

## (undated) DEVICE — TUBE SET 96 MM 64 MM H2O PERISTALTIC STD AUX CHANNEL

## (undated) DEVICE — Device: Brand: ENDO SMARTCAP

## (undated) DEVICE — TUBING, SUCTION, 1/4" X 10', STRAIGHT: Brand: MEDLINE

## (undated) DEVICE — TRAP POLYP BALEEN

## (undated) DEVICE — BRUSH ENDO CLN L90.5IN SHTH DIA1.7MM BRIST DIA5-7MM 2-6MM

## (undated) DEVICE — GLOVE ORANGE PI 8 1/2   MSG9085

## (undated) DEVICE — SNARE ENDOSCP AD L240CM LOOP W10MM SHTH DIA2.4MM RND INSUL

## (undated) DEVICE — ENDO CARRY-ON PROCEDURE KIT: Brand: ENDO CARRY-ON PROCEDURE KIT

## (undated) DEVICE — SINGLE PORT MANIFOLD: Brand: NEPTUNE 2

## (undated) DEVICE — PATIENT RETURN ELECTRODE, SINGLE-USE, NON CONTACT QUALITY MONITORING, ADULT, WITH 9 FT (2.7 M) CORD, FOR PATIENTS WEIGHING OVER 33LBS. (15KG): Brand: MEGADYNE